# Patient Record
Sex: MALE | Race: WHITE | NOT HISPANIC OR LATINO | Employment: PART TIME | URBAN - METROPOLITAN AREA
[De-identification: names, ages, dates, MRNs, and addresses within clinical notes are randomized per-mention and may not be internally consistent; named-entity substitution may affect disease eponyms.]

---

## 2019-12-04 ENCOUNTER — HOSPITAL ENCOUNTER (INPATIENT)
Facility: HOSPITAL | Age: 44
LOS: 2 days | Discharge: HOME | DRG: 440 | End: 2019-12-06
Attending: EMERGENCY MEDICINE | Admitting: HOSPITALIST
Payer: MEDICAID

## 2019-12-04 DIAGNOSIS — K85.90 ACUTE PANCREATITIS: Primary | ICD-10-CM

## 2019-12-04 DIAGNOSIS — K85.90 ACUTE PANCREATITIS, UNSPECIFIED COMPLICATION STATUS, UNSPECIFIED PANCREATITIS TYPE: ICD-10-CM

## 2019-12-04 DIAGNOSIS — R07.9 CHEST PAIN: ICD-10-CM

## 2019-12-04 DIAGNOSIS — R10.13 EPIGASTRIC PAIN: ICD-10-CM

## 2019-12-04 PROBLEM — I10 ESSENTIAL HYPERTENSION: Status: ACTIVE | Noted: 2019-12-04

## 2019-12-04 PROBLEM — Z72.0 TOBACCO ABUSE: Status: ACTIVE | Noted: 2019-12-04

## 2019-12-04 PROBLEM — Z72.0 TOBACCO ABUSE: Chronic | Status: ACTIVE | Noted: 2019-12-04

## 2019-12-04 PROBLEM — I10 ESSENTIAL HYPERTENSION: Chronic | Status: ACTIVE | Noted: 2019-12-04

## 2019-12-04 LAB
ALBUMIN SERPL-MCNC: 3.8 G/DL (ref 3.3–5.5)
ALBUMIN SERPL-MCNC: 3.8 G/DL (ref 3.3–5.5)
ALLENS TEST: ABNORMAL
ALP SERPL-CCNC: 60 U/L (ref 42–141)
ALP SERPL-CCNC: 64 U/L (ref 42–141)
BILIRUB SERPL-MCNC: 0.5 MG/DL (ref 0.2–1.6)
BILIRUB SERPL-MCNC: 0.6 MG/DL (ref 0.2–1.6)
BILIRUBIN, POC UA: NEGATIVE
BLOOD, POC UA: NEGATIVE
BUN SERPL-MCNC: 17 MG/DL (ref 7–22)
CALCIUM SERPL-MCNC: 9.8 MG/DL (ref 8–10.3)
CHLORIDE SERPL-SCNC: 103 MMOL/L (ref 98–108)
CLARITY, POC UA: CLEAR
COLOR, POC UA: YELLOW
CREAT SERPL-MCNC: 1 MG/DL (ref 0.6–1.2)
GLUCOSE SERPL-MCNC: 101 MG/DL (ref 73–118)
GLUCOSE, POC UA: NEGATIVE
HCO3 UR-SCNC: 26.7 MMOL/L (ref 24–28)
KETONES, POC UA: NEGATIVE
LDH SERPL L TO P-CCNC: 0.94 MMOL/L (ref 0.5–2.2)
LEUKOCYTE EST, POC UA: NEGATIVE
LIPASE SERPL-CCNC: 57 U/L (ref 4–60)
NITRITE, POC UA: NEGATIVE
PCO2 BLDA: 47.4 MMHG (ref 35–45)
PH SMN: 7.36 [PH] (ref 7.35–7.45)
PH UR STRIP: 6 [PH]
PO2 BLDA: 46 MMHG (ref 40–60)
POC ALT (SGPT): 38 U/L (ref 10–47)
POC ALT (SGPT): 43 U/L (ref 10–47)
POC AMYLASE: 48 U/L (ref 14–97)
POC AST (SGOT): 28 U/L (ref 11–38)
POC AST (SGOT): 29 U/L (ref 11–38)
POC BE: 1 MMOL/L
POC CARDIAC TROPONIN I: 0 NG/ML
POC GGT: 53 U/L (ref 5–65)
POC SATURATED O2: 79 % (ref 95–100)
POC TCO2: 28 MMOL/L (ref 18–33)
POC TCO2: 28 MMOL/L (ref 24–29)
POTASSIUM BLD-SCNC: 3.9 MMOL/L (ref 3.6–5.1)
PROTEIN, POC UA: NEGATIVE
PROTEIN, POC: 7 G/DL (ref 6.4–8.1)
PROTEIN, POC: 7.3 G/DL (ref 6.4–8.1)
SAMPLE: ABNORMAL
SAMPLE: NORMAL
SITE: ABNORMAL
SODIUM BLD-SCNC: 144 MMOL/L (ref 128–145)
SPECIFIC GRAVITY, POC UA: 1.01
UROBILINOGEN, POC UA: 0.2 E.U./DL

## 2019-12-04 PROCEDURE — 82803 BLOOD GASES ANY COMBINATION: CPT | Mod: ER

## 2019-12-04 PROCEDURE — 25000003 PHARM REV CODE 250: Performed by: INTERNAL MEDICINE

## 2019-12-04 PROCEDURE — 11000001 HC ACUTE MED/SURG PRIVATE ROOM

## 2019-12-04 PROCEDURE — 86677 HELICOBACTER PYLORI ANTIBODY: CPT

## 2019-12-04 PROCEDURE — 85025 COMPLETE CBC W/AUTO DIFF WBC: CPT | Mod: ER

## 2019-12-04 PROCEDURE — 99284 EMERGENCY DEPT VISIT MOD MDM: CPT | Mod: 25,ER

## 2019-12-04 PROCEDURE — 63600175 PHARM REV CODE 636 W HCPCS: Performed by: INTERNAL MEDICINE

## 2019-12-04 PROCEDURE — 81003 URINALYSIS AUTO W/O SCOPE: CPT | Mod: ER

## 2019-12-04 PROCEDURE — 84484 ASSAY OF TROPONIN QUANT: CPT | Mod: ER

## 2019-12-04 PROCEDURE — 63600175 PHARM REV CODE 636 W HCPCS: Mod: ER | Performed by: EMERGENCY MEDICINE

## 2019-12-04 PROCEDURE — 25000003 PHARM REV CODE 250: Mod: ER | Performed by: EMERGENCY MEDICINE

## 2019-12-04 PROCEDURE — 96361 HYDRATE IV INFUSION ADD-ON: CPT | Mod: ER

## 2019-12-04 PROCEDURE — 82150 ASSAY OF AMYLASE: CPT | Mod: ER

## 2019-12-04 PROCEDURE — 25500020 PHARM REV CODE 255: Mod: ER | Performed by: EMERGENCY MEDICINE

## 2019-12-04 PROCEDURE — 93010 EKG 12-LEAD: ICD-10-PCS | Mod: ,,, | Performed by: INTERNAL MEDICINE

## 2019-12-04 PROCEDURE — 93005 ELECTROCARDIOGRAM TRACING: CPT | Mod: ER

## 2019-12-04 PROCEDURE — 96374 THER/PROPH/DIAG INJ IV PUSH: CPT | Mod: ER

## 2019-12-04 PROCEDURE — 93010 ELECTROCARDIOGRAM REPORT: CPT | Mod: ,,, | Performed by: INTERNAL MEDICINE

## 2019-12-04 PROCEDURE — S0028 INJECTION, FAMOTIDINE, 20 MG: HCPCS | Mod: ER | Performed by: EMERGENCY MEDICINE

## 2019-12-04 PROCEDURE — 83690 ASSAY OF LIPASE: CPT

## 2019-12-04 PROCEDURE — 96375 TX/PRO/DX INJ NEW DRUG ADDON: CPT | Mod: ER

## 2019-12-04 PROCEDURE — 80053 COMPREHEN METABOLIC PANEL: CPT | Mod: ER

## 2019-12-04 RX ORDER — SODIUM CHLORIDE 0.9 % (FLUSH) 0.9 %
10 SYRINGE (ML) INJECTION
Status: CANCELLED | OUTPATIENT
Start: 2019-12-04

## 2019-12-04 RX ORDER — KETOROLAC TROMETHAMINE 30 MG/ML
15 INJECTION, SOLUTION INTRAMUSCULAR; INTRAVENOUS
Status: COMPLETED | OUTPATIENT
Start: 2019-12-04 | End: 2019-12-04

## 2019-12-04 RX ORDER — MORPHINE SULFATE 8 MG/ML
4 INJECTION INTRAMUSCULAR; INTRAVENOUS; SUBCUTANEOUS EVERY 4 HOURS PRN
Status: CANCELLED | OUTPATIENT
Start: 2019-12-04

## 2019-12-04 RX ORDER — GABAPENTIN 300 MG/1
300 CAPSULE ORAL 3 TIMES DAILY
Status: DISCONTINUED | OUTPATIENT
Start: 2019-12-04 | End: 2019-12-06 | Stop reason: HOSPADM

## 2019-12-04 RX ORDER — MORPHINE SULFATE 10 MG/ML
4 INJECTION INTRAMUSCULAR; INTRAVENOUS; SUBCUTANEOUS EVERY 4 HOURS PRN
Status: DISCONTINUED | OUTPATIENT
Start: 2019-12-04 | End: 2019-12-06

## 2019-12-04 RX ORDER — FAMOTIDINE 10 MG/ML
20 INJECTION INTRAVENOUS EVERY 12 HOURS
Status: CANCELLED | OUTPATIENT
Start: 2019-12-04

## 2019-12-04 RX ORDER — ONDANSETRON 2 MG/ML
8 INJECTION INTRAMUSCULAR; INTRAVENOUS EVERY 8 HOURS PRN
Status: DISCONTINUED | OUTPATIENT
Start: 2019-12-04 | End: 2019-12-06 | Stop reason: HOSPADM

## 2019-12-04 RX ORDER — TALC
9 POWDER (GRAM) TOPICAL NIGHTLY PRN
Status: DISCONTINUED | OUTPATIENT
Start: 2019-12-04 | End: 2019-12-06 | Stop reason: HOSPADM

## 2019-12-04 RX ORDER — GABAPENTIN 300 MG/1
300 CAPSULE ORAL 3 TIMES DAILY
COMMUNITY

## 2019-12-04 RX ORDER — AMLODIPINE BESYLATE 10 MG/1
10 TABLET ORAL DAILY
COMMUNITY

## 2019-12-04 RX ORDER — CLONIDINE HYDROCHLORIDE 0.1 MG/1
0.1 TABLET ORAL 3 TIMES DAILY PRN
Status: DISCONTINUED | OUTPATIENT
Start: 2019-12-04 | End: 2019-12-06 | Stop reason: HOSPADM

## 2019-12-04 RX ORDER — AMOXICILLIN 250 MG
1 CAPSULE ORAL 2 TIMES DAILY PRN
Status: DISCONTINUED | OUTPATIENT
Start: 2019-12-04 | End: 2019-12-06 | Stop reason: HOSPADM

## 2019-12-04 RX ORDER — HYDROMORPHONE HYDROCHLORIDE 2 MG/ML
1 INJECTION, SOLUTION INTRAMUSCULAR; INTRAVENOUS; SUBCUTANEOUS EVERY 4 HOURS PRN
Status: CANCELLED | OUTPATIENT
Start: 2019-12-04

## 2019-12-04 RX ORDER — ONDANSETRON 2 MG/ML
8 INJECTION INTRAMUSCULAR; INTRAVENOUS
Status: COMPLETED | OUTPATIENT
Start: 2019-12-04 | End: 2019-12-04

## 2019-12-04 RX ORDER — IBUPROFEN 200 MG
1 TABLET ORAL DAILY
Status: DISCONTINUED | OUTPATIENT
Start: 2019-12-05 | End: 2019-12-06 | Stop reason: HOSPADM

## 2019-12-04 RX ORDER — ACETAMINOPHEN 500 MG
500 TABLET ORAL EVERY 6 HOURS PRN
Status: DISCONTINUED | OUTPATIENT
Start: 2019-12-04 | End: 2019-12-06 | Stop reason: HOSPADM

## 2019-12-04 RX ORDER — SODIUM CHLORIDE, SODIUM LACTATE, POTASSIUM CHLORIDE, CALCIUM CHLORIDE 600; 310; 30; 20 MG/100ML; MG/100ML; MG/100ML; MG/100ML
1000 INJECTION, SOLUTION INTRAVENOUS
Status: COMPLETED | OUTPATIENT
Start: 2019-12-04 | End: 2019-12-04

## 2019-12-04 RX ORDER — OXYCODONE HYDROCHLORIDE 5 MG/1
5 TABLET ORAL EVERY 6 HOURS PRN
Status: DISCONTINUED | OUTPATIENT
Start: 2019-12-04 | End: 2019-12-06 | Stop reason: HOSPADM

## 2019-12-04 RX ORDER — PROCHLORPERAZINE EDISYLATE 5 MG/ML
5 INJECTION INTRAMUSCULAR; INTRAVENOUS EVERY 6 HOURS PRN
Status: DISCONTINUED | OUTPATIENT
Start: 2019-12-04 | End: 2019-12-06 | Stop reason: HOSPADM

## 2019-12-04 RX ORDER — FAMOTIDINE 10 MG/ML
40 INJECTION INTRAVENOUS
Status: COMPLETED | OUTPATIENT
Start: 2019-12-04 | End: 2019-12-04

## 2019-12-04 RX ORDER — AMLODIPINE BESYLATE 5 MG/1
10 TABLET ORAL DAILY
Status: DISCONTINUED | OUTPATIENT
Start: 2019-12-05 | End: 2019-12-06 | Stop reason: HOSPADM

## 2019-12-04 RX ORDER — MORPHINE SULFATE 8 MG/ML
4 INJECTION INTRAMUSCULAR; INTRAVENOUS; SUBCUTANEOUS
Status: COMPLETED | OUTPATIENT
Start: 2019-12-04 | End: 2019-12-04

## 2019-12-04 RX ORDER — SODIUM CHLORIDE, SODIUM LACTATE, POTASSIUM CHLORIDE, CALCIUM CHLORIDE 600; 310; 30; 20 MG/100ML; MG/100ML; MG/100ML; MG/100ML
INJECTION, SOLUTION INTRAVENOUS CONTINUOUS
Status: DISCONTINUED | OUTPATIENT
Start: 2019-12-04 | End: 2019-12-05

## 2019-12-04 RX ADMIN — SODIUM CHLORIDE, SODIUM LACTATE, POTASSIUM CHLORIDE, AND CALCIUM CHLORIDE: .6; .31; .03; .02 INJECTION, SOLUTION INTRAVENOUS at 09:12

## 2019-12-04 RX ADMIN — SODIUM CHLORIDE 1000 ML: 0.9 INJECTION, SOLUTION INTRAVENOUS at 03:12

## 2019-12-04 RX ADMIN — FAMOTIDINE 40 MG: 10 INJECTION INTRAVENOUS at 03:12

## 2019-12-04 RX ADMIN — IOHEXOL 100 ML: 350 INJECTION, SOLUTION INTRAVENOUS at 04:12

## 2019-12-04 RX ADMIN — OXYCODONE HYDROCHLORIDE 5 MG: 5 TABLET ORAL at 09:12

## 2019-12-04 RX ADMIN — SODIUM CHLORIDE, SODIUM LACTATE, POTASSIUM CHLORIDE, AND CALCIUM CHLORIDE 1000 ML: .6; .31; .03; .02 INJECTION, SOLUTION INTRAVENOUS at 05:12

## 2019-12-04 RX ADMIN — MORPHINE SULFATE 4 MG: 8 INJECTION, SOLUTION INTRAMUSCULAR; INTRAVENOUS at 05:12

## 2019-12-04 RX ADMIN — ONDANSETRON 8 MG: 2 INJECTION INTRAMUSCULAR; INTRAVENOUS at 05:12

## 2019-12-04 RX ADMIN — GABAPENTIN 300 MG: 300 CAPSULE ORAL at 09:12

## 2019-12-04 RX ADMIN — MORPHINE SULFATE 4 MG: 10 INJECTION INTRAVENOUS at 11:12

## 2019-12-04 RX ADMIN — KETOROLAC TROMETHAMINE 15 MG: 30 INJECTION, SOLUTION INTRAMUSCULAR at 03:12

## 2019-12-04 NOTE — ED TRIAGE NOTES
"Pt c/o of umbilicus pain that started a couple months ago. Pt states the pain is worse and he "is tired of dealing with this." PT reports "flu like pain in my legs, arms, and chest and really hot for like a few months now."   "

## 2019-12-04 NOTE — ED PROVIDER NOTES
Encounter Date: 12/4/2019    SCRIBE #1 NOTE: I, Jonathan Contreras, am scribing for, and in the presence of,  Dr. Iniguez. I have scribed the following portions of the note - the EKG reading. Other sections scribed: HPI, ROS, PE.       History     Chief Complaint   Patient presents with    Abdominal Pain     pt reports epigastric abdominal pain x 3 months Pt with generalized body x 3 months as well. Denies fever and vomiting. Pt reports intermittent diarrhea      Roverto Delgado is a 44 y.o. male with history of pancreatitis and HTN who presents to the ED complaining of epigastric pain. Patient states it feels like he has pancreatitis again.  Patient reports  myalgias to the upper and lower extremities and 6/10 sharp non-radiating epigastric abdominal pain worse over last few days but on and off for 3 months. Abdominal pain is prominent at 8/10 at night. Patient also reports intermittent diarrhea, nausea, and vomiting. PSHx includes cholecystitis. PCP is Yuliya Rashid. Patient takes gabapentin. Denies smoking or vaping. Patient does chew tobacco. He reports drinks 5-6 beers 3 days ago and categorizes himself as an occasional drinker, not a daily drinker    The history is provided by the patient. No  was used.     Review of patient's allergies indicates:  No Known Allergies  Past Medical History:   Diagnosis Date    Hypertension     Pancreatitis      No past surgical history on file.  No family history on file.  Social History     Tobacco Use    Smoking status: Current Some Day Smoker    Smokeless tobacco: Current User     Types: Snuff   Substance Use Topics    Alcohol use: Not Currently     Comment: occasionally    Drug use: Never     Review of Systems   Constitutional: Negative for fever.   HENT: Negative for rhinorrhea and sore throat.    Respiratory: Negative for shortness of breath.    Cardiovascular: Negative for leg swelling.   Gastrointestinal: Positive for abdominal pain (epigastric),  diarrhea (intermittent), nausea (intermittent) and vomiting (intermittent).   Musculoskeletal: Positive for myalgias (upper and lower extremities).   Skin: Negative for rash.   Neurological: Negative for numbness.   All other systems reviewed and are negative.      Physical Exam     Initial Vitals [12/04/19 1341]   BP Pulse Resp Temp SpO2   (!) 162/102 84 18 98.6 °F (37 °C) 99 %      MAP       --         Patient gave consent to have physical exam performed.    Physical Exam    Nursing note and vitals reviewed.  Constitutional: He appears well-developed and well-nourished.   HENT:   Head: Normocephalic and atraumatic.   Right Ear: External ear normal.   Left Ear: External ear normal.   Nose: Nose normal.   Mouth/Throat: Oropharynx is clear and moist.   Eyes: Conjunctivae and EOM are normal. Pupils are equal, round, and reactive to light.   Neck: Normal range of motion. Neck supple.   Cardiovascular: Normal rate, regular rhythm, normal heart sounds and intact distal pulses. Exam reveals no gallop and no friction rub.    No murmur heard.  Pulmonary/Chest: Breath sounds normal. No stridor. No respiratory distress. He has no wheezes. He has no rhonchi. He has no rales. He exhibits no tenderness.   Abdominal: Soft. Bowel sounds are normal. He exhibits no distension and no mass. There is tenderness in the epigastric area. There is no rigidity, no rebound and no guarding.   Musculoskeletal: Normal range of motion.   Neurological: He is alert and oriented to person, place, and time. No cranial nerve deficit or sensory deficit. GCS score is 15. GCS eye subscore is 4. GCS verbal subscore is 5. GCS motor subscore is 6.   Skin: Skin is warm and dry. Capillary refill takes less than 2 seconds. No rash noted.   Psychiatric: He has a normal mood and affect. His behavior is normal.         ED Course   Critical Care  Date/Time: 12/4/2019 6:19 PM  Performed by: Sejal Iniguez DO  Authorized by: Sejal Iniguez DO   Direct patient critical  care time: 8 minutes  Additional history critical care time: 8 minutes  Ordering / reviewing critical care time: 8 minutes  Documentation critical care time: 8 minutes  Consulting other physicians critical care time: 8 minutes  Total critical care time (exclusive of procedural time) : 40 minutes  Critical care was necessary to treat or prevent imminent or life-threatening deterioration of the following conditions: sepsis, circulatory failure, renal failure and dehydration.  Critical care was time spent personally by me on the following activities: evaluation of patient's response to treatment, examination of patient, obtaining history from patient or surrogate, ordering and performing treatments and interventions, ordering and review of laboratory studies, ordering and review of radiographic studies, pulse oximetry, re-evaluation of patient's condition and review of old charts.        Labs Reviewed   H. PYLORI ANTIBODY, IGG   LIPASE   POCT CBC   POCT URINALYSIS W/O SCOPE   POCT URINALYSIS W/O SCOPE   POCT CMP   POCT AMYLASE   POCT LIVER PANEL   POCT CMP   POCT TROPONIN             EKG Readings: (Independently Interpreted)   No STEMI. Rate of 85. Normal Sinus Rhythm. Normal Axis. Normal EKG. QTc normal at 437. No prior EKG for comparison.       Imaging Results           CT Abdomen Pelvis With Contrast (Final result)  Result time 12/04/19 16:25:43    Final result by Amina Finnegan MD (12/04/19 16:25:43)                 Impression:      Edematous pancreatic head.  Of adjacent peripancreatic infiltration.  Findings are concerning for acute pancreatitis.  Recommend correlation with amylase and lipase.    Mildly lobular margins of the liver.  Enlargement of the spleen.  Findings may suggest the stigmata of cirrhosis.  Consider correlation with liver function tests.    Subcentimeter right hepatic cysts.    This report was flagged in Epic as abnormal.      Electronically signed by: Amina  Kain  Date:    12/04/2019  Time:    16:25             Narrative:    EXAMINATION:  CT OF ABDOMEN PELVIS WITH    CLINICAL HISTORY:  Abd pain, fever, abscess suspected;Nausea, vomiting, diarrhea;Epigastric pain with history of pancreatitis;    TECHNIQUE:  5 mm enhanced axial images were obtained from the lung bases through the greater trochanters.  One hundred mL of Omnipaque 350 was injected.    COMPARISON:  None.    FINDINGS:  The contour margins of the liver are slightly lobular.    The pancreatic head is edematous.  There's extensive arm peripancreatic infiltration about the pancreatic head.    The spleen is mildly enlarged measuring approximately 12.3 x 5.4 cm.    There are subcentimeter right hepatic cysts.    The left kidney and adrenal glands are unremarkable. The gallbladder is surgically absent..    There is no definite evidence for abdominal adenopathy or ascites.  There is a tiny fat containing umbilical hernia.    There are no pelvic masses or adenopathy.  The appendix is normal.    There is no free fluid in the pelvis.    There is minimal bibasilar atelectasis.    There's straightening of the normal lumbar lordosis, which may indicate muscular spasm..  There are marginal osteophytes.                                 Medical Decision Making:   History:   Old Medical Records: I decided to obtain old medical records.  Independently Interpreted Test(s):   I have ordered and independently interpreted EKG Reading(s) - see prior notes  Clinical Tests:   Lab Tests: Ordered and Reviewed  Radiological Study: Reviewed and Ordered  Medical Tests: Ordered and Reviewed    Medical decision making   Chief complaint: epigastric pain, myalgias, intermittent nausea, vomiting, and diarrhea.  Differential diagnosis: Gastritis, gastroenteritis, pancreatitis, small bowel obstruction, urinary tract infection, and electrolyte imbalance.    Patient is agreeable to transfer & admission at [facility].    Requesting  consultation with hospitalist for services not available at this facility.   Discussed patient's presentation, past medical history, physical exam, labs, radiology results, vital signs, and ED course.  Consultation with DR DONAHUE. for transfer and admission at 6:20.  At this time patient will be transferred & admitted.  Admit orders completed as requested.  Patient will be transferred via EMS to accepting facility.      At time of transfer patient is awake alert oriented x4 speaking clearly in full sentences and moving all 4 extremities.            Scribe Attestation:   Scribe #1: I performed the above scribed service and the documentation accurately describes the services I performed. I attest to the accuracy of the note.     I, Dr. Sejal Iniguez, personally performed the services described in this documentation. This document was produced by a scribe under my direction and in my presence. All medical record entries made by the scribe were at my direction and in my presence.  I have reviewed the chart and agree that the record reflects my personal performance and is accurate and complete. Sejal Iniguez DO.     12/04/2019 5:33 PM                        Clinical Impression:     1. Acute pancreatitis    2. Chest pain    3. Epigastric pain    4. Acute pancreatitis, unspecified complication status, unspecified pancreatitis type                                Sejal Iniguez DO  12/04/19 4092

## 2019-12-05 LAB
ALBUMIN SERPL BCP-MCNC: 4.1 G/DL (ref 3.5–5.2)
ALP SERPL-CCNC: 69 U/L (ref 55–135)
ALT SERPL W/O P-5'-P-CCNC: 35 U/L (ref 10–44)
AMYLASE SERPL-CCNC: 49 U/L (ref 20–110)
ANION GAP SERPL CALC-SCNC: 10 MMOL/L (ref 8–16)
AST SERPL-CCNC: 19 U/L (ref 10–40)
BASOPHILS # BLD AUTO: 0.08 K/UL (ref 0–0.2)
BASOPHILS NFR BLD: 0.8 % (ref 0–1.9)
BILIRUB SERPL-MCNC: 0.6 MG/DL (ref 0.1–1)
BUN SERPL-MCNC: 10 MG/DL (ref 6–20)
CALCIUM SERPL-MCNC: 9.6 MG/DL (ref 8.7–10.5)
CHLORIDE SERPL-SCNC: 108 MMOL/L (ref 95–110)
CHOLEST SERPL-MCNC: 222 MG/DL (ref 120–199)
CHOLEST/HDLC SERPL: 4.8 {RATIO} (ref 2–5)
CO2 SERPL-SCNC: 31 MMOL/L (ref 23–29)
CREAT SERPL-MCNC: 0.9 MG/DL (ref 0.5–1.4)
DIFFERENTIAL METHOD: ABNORMAL
EOSINOPHIL # BLD AUTO: 0.9 K/UL (ref 0–0.5)
EOSINOPHIL NFR BLD: 9.3 % (ref 0–8)
ERYTHROCYTE [DISTWIDTH] IN BLOOD BY AUTOMATED COUNT: 12.4 % (ref 11.5–14.5)
EST. GFR  (AFRICAN AMERICAN): >60 ML/MIN/1.73 M^2
EST. GFR  (NON AFRICAN AMERICAN): >60 ML/MIN/1.73 M^2
GLUCOSE SERPL-MCNC: 85 MG/DL (ref 70–110)
H PYLORI IGG SERPL QL IA: POSITIVE
HCT VFR BLD AUTO: 41.5 % (ref 40–54)
HDLC SERPL-MCNC: 46 MG/DL (ref 40–75)
HDLC SERPL: 20.7 % (ref 20–50)
HGB BLD-MCNC: 13.9 G/DL (ref 14–18)
IMM GRANULOCYTES # BLD AUTO: 0.02 K/UL (ref 0–0.04)
IMM GRANULOCYTES NFR BLD AUTO: 0.2 % (ref 0–0.5)
LDH SERPL L TO P-CCNC: 174 U/L (ref 110–260)
LDLC SERPL CALC-MCNC: 141 MG/DL (ref 63–159)
LIPASE SERPL-CCNC: 27 U/L (ref 4–60)
LYMPHOCYTES # BLD AUTO: 3.4 K/UL (ref 1–4.8)
LYMPHOCYTES NFR BLD: 35.3 % (ref 18–48)
MAGNESIUM SERPL-MCNC: 2.2 MG/DL (ref 1.6–2.6)
MCH RBC QN AUTO: 32.3 PG (ref 27–31)
MCHC RBC AUTO-ENTMCNC: 33.5 G/DL (ref 32–36)
MCV RBC AUTO: 96 FL (ref 82–98)
MONOCYTES # BLD AUTO: 0.7 K/UL (ref 0.3–1)
MONOCYTES NFR BLD: 7.3 % (ref 4–15)
NEUTROPHILS # BLD AUTO: 4.5 K/UL (ref 1.8–7.7)
NEUTROPHILS NFR BLD: 47.1 % (ref 38–73)
NONHDLC SERPL-MCNC: 176 MG/DL
NRBC BLD-RTO: 0 /100 WBC
PHOSPHATE SERPL-MCNC: 3.3 MG/DL (ref 2.7–4.5)
PLATELET # BLD AUTO: 233 K/UL (ref 150–350)
PMV BLD AUTO: 9.9 FL (ref 9.2–12.9)
POTASSIUM SERPL-SCNC: 4.3 MMOL/L (ref 3.5–5.1)
PROT SERPL-MCNC: 6.8 G/DL (ref 6–8.4)
RBC # BLD AUTO: 4.31 M/UL (ref 4.6–6.2)
SODIUM SERPL-SCNC: 149 MMOL/L (ref 136–145)
TRIGL SERPL-MCNC: 175 MG/DL (ref 30–150)
WBC # BLD AUTO: 9.58 K/UL (ref 3.9–12.7)

## 2019-12-05 PROCEDURE — 63600175 PHARM REV CODE 636 W HCPCS: Performed by: HOSPITALIST

## 2019-12-05 PROCEDURE — 85025 COMPLETE CBC W/AUTO DIFF WBC: CPT

## 2019-12-05 PROCEDURE — 83735 ASSAY OF MAGNESIUM: CPT

## 2019-12-05 PROCEDURE — 84100 ASSAY OF PHOSPHORUS: CPT

## 2019-12-05 PROCEDURE — 11000001 HC ACUTE MED/SURG PRIVATE ROOM

## 2019-12-05 PROCEDURE — 80053 COMPREHEN METABOLIC PANEL: CPT

## 2019-12-05 PROCEDURE — 36415 COLL VENOUS BLD VENIPUNCTURE: CPT

## 2019-12-05 PROCEDURE — 80061 LIPID PANEL: CPT

## 2019-12-05 PROCEDURE — 82150 ASSAY OF AMYLASE: CPT

## 2019-12-05 PROCEDURE — 83690 ASSAY OF LIPASE: CPT

## 2019-12-05 PROCEDURE — 25000003 PHARM REV CODE 250: Performed by: INTERNAL MEDICINE

## 2019-12-05 PROCEDURE — 83615 LACTATE (LD) (LDH) ENZYME: CPT

## 2019-12-05 PROCEDURE — S4991 NICOTINE PATCH NONLEGEND: HCPCS | Performed by: INTERNAL MEDICINE

## 2019-12-05 PROCEDURE — 63600175 PHARM REV CODE 636 W HCPCS: Performed by: INTERNAL MEDICINE

## 2019-12-05 RX ORDER — SODIUM CHLORIDE, SODIUM LACTATE, POTASSIUM CHLORIDE, CALCIUM CHLORIDE 600; 310; 30; 20 MG/100ML; MG/100ML; MG/100ML; MG/100ML
INJECTION, SOLUTION INTRAVENOUS CONTINUOUS
Status: DISCONTINUED | OUTPATIENT
Start: 2019-12-05 | End: 2019-12-06 | Stop reason: HOSPADM

## 2019-12-05 RX ADMIN — GABAPENTIN 300 MG: 300 CAPSULE ORAL at 02:12

## 2019-12-05 RX ADMIN — SODIUM CHLORIDE, SODIUM LACTATE, POTASSIUM CHLORIDE, AND CALCIUM CHLORIDE: .6; .31; .03; .02 INJECTION, SOLUTION INTRAVENOUS at 02:12

## 2019-12-05 RX ADMIN — MORPHINE SULFATE 4 MG: 10 INJECTION INTRAVENOUS at 02:12

## 2019-12-05 RX ADMIN — SODIUM CHLORIDE, SODIUM LACTATE, POTASSIUM CHLORIDE, AND CALCIUM CHLORIDE: .6; .31; .03; .02 INJECTION, SOLUTION INTRAVENOUS at 07:12

## 2019-12-05 RX ADMIN — GABAPENTIN 300 MG: 300 CAPSULE ORAL at 08:12

## 2019-12-05 RX ADMIN — Medication 9 MG: at 08:12

## 2019-12-05 RX ADMIN — AMLODIPINE BESYLATE 10 MG: 5 TABLET ORAL at 07:12

## 2019-12-05 RX ADMIN — GABAPENTIN 300 MG: 300 CAPSULE ORAL at 07:12

## 2019-12-05 RX ADMIN — MORPHINE SULFATE 4 MG: 10 INJECTION INTRAVENOUS at 08:12

## 2019-12-05 RX ADMIN — MORPHINE SULFATE 4 MG: 10 INJECTION INTRAVENOUS at 07:12

## 2019-12-05 RX ADMIN — MORPHINE SULFATE 4 MG: 10 INJECTION INTRAVENOUS at 03:12

## 2019-12-05 RX ADMIN — SODIUM CHLORIDE, SODIUM LACTATE, POTASSIUM CHLORIDE, AND CALCIUM CHLORIDE: .6; .31; .03; .02 INJECTION, SOLUTION INTRAVENOUS at 08:12

## 2019-12-05 RX ADMIN — NICOTINE 1 PATCH: 21 PATCH, EXTENDED RELEASE TRANSDERMAL at 07:12

## 2019-12-05 NOTE — CONSULTS
.[facility]  Gastroenterology  Consult Note    Patient Name: Roverto Delgado  MRN: [id number]  Admission Date: 12/4/2019  Hospital Length of Stay: 1 days  Code Status: Full Code   Primary Care Physician: Primary Doctor No  Principal Problem:Acute pancreatitis    Consults  Subjective:     Chief complaint: Abdominal pain.    HPI: The patient is a 44 year old male with a history of HTN and biliary pancreatitis s/p lap luis presenting with acute pancreatitis.  He reports chronic, intermittent epigastric pain that has been occurring over the last two months.  The pain is moderate to severe, non-radiating, described as sharp/stabbing in nature.  It is typically aggravated by meals and alcohol.  There are no obvious palliative factors.  It occurs almost daily and can last several hours at a time.  He notes occasional nausea and vomiting associated with the pain.  He will occasionally have nonbloody diarrhea.  He admits to social use of alcohol (10-12 beers on the weekends) but denies every day use.  He denies a history of jaundice.  No fever or chills.  He reports an episode of pancreatitis that occurred three years ago.  This was felt to be biliary, and he had his gallbladder removed.    Past medical history:  Hypertension.  Biliary pancreatitis.    Past surgical history:  Laparoscopic cholecystectomy.  Surgery on heels.    Social history:  Tobacco use: chewing tobacco.  Alcohol use: 10-12 beers weekly.  Illicit drug use: denies.    Family history:  No family history of liver or colon cancer.    Medications:  Medications Prior to Admission   Medication Sig Dispense Refill Last Dose    amLODIPine (NORVASC) 10 MG tablet Take 10 mg by mouth once daily.   12/3/2019 at Unknown time    gabapentin (NEURONTIN) 300 MG capsule Take 300 mg by mouth 3 (three) times daily.   12/4/2019 at Unknown time       Allergies:  No known drug allergies.    Review of systems:  CONSTITUTIONAL: Negative for fever, chills,  weakness, weight loss, weight gain.  HEENT: Negative for blurred vision, hearing loss, nasal congestion, dry mouth, sore throat.  CARDIOVASCULAR: Negative for chest pain or palpitations.  RESPIRATORY: Negative for SOB or cough.  GASTROINTESTINAL: See HPI  GENITOURINARY: Negative for dysuria or hematuria.  MUSCULOSKELETAL: Negative for osteoarthritis or muscle pain.  SKIN: Negative for rashes/lesions.  NEUROLOGIC: Negative for headaches, numbness/tingling.  ENDOCRINE: Negative for diabetes or thyroid abnormalities.  HEMATOLOGIC: Negative for anemia or blood dyscrasias.    Objective:     Vital Signs (Most Recent):  Temp: 98 °F (36.7 °C) (12/05/19 1105)  Pulse: 77 (12/05/19 1105)  Resp: 18 (12/05/19 1105)  BP: 133/82 (12/05/19 1105)  SpO2: 96 % (12/05/19 1105) Vital Signs (24h Range):  Temp:  [97.5 °F (36.4 °C)-98.1 °F (36.7 °C)] 98 °F (36.7 °C)  Pulse:  [57-77] 77  Resp:  [18-19] 18  SpO2:  [95 %-100 %] 96 %  BP: (125-155)/() 133/82     Physical examination:  General: Well developed white male in no apparent distress.  HENT: NCAT, atraumatic, hearing grossly intact, no visible or palpable thyroid mass  Eyes: PERRL, EOMI, anicteric sclera  Cardiovascular: Regular rate and rhythm. No peripheral edema.   Lungs: Non-labored respirations. Breath sounds equal.   Abdomen: + BS. Minimal epigastric TTP. Nondistended.  Extremities: No C/C, 2+ dorsalis pedis pulses bilaterally  Neuro: AA&O x 3, no asterixes or tremors  Psych: Appropriate mood and affect. No SI.  Skin: No jaundice, rashes or lesions  Musculoskeletal: 5/5 strength bilaterally    CBC:   Recent Labs   Lab 12/05/19  0445   WBC 9.58   HGB 13.9*   HCT 41.5        CMP:   Recent Labs   Lab 12/05/19  0445   GLU 85   CALCIUM 9.6   ALBUMIN 4.1   PROT 6.8   *   K 4.3   CO2 31*      BUN 10   CREATININE 0.9   ALKPHOS 69   ALT 35   AST 19   BILITOT 0.6     Lipase:   Recent Labs   Lab 12/04/19  1714   LIPASE 57       Imaging:  CT abd/pelvis  (12/4/19):  Impression:  Edematous pancreatic head.  Of adjacent peripancreatic infiltration.  Findings are concerning for acute pancreatitis.  Recommend correlation with amylase and lipase.  Mildly lobular margins of the liver.  Enlargement of the spleen.  Findings may suggest the stigmata of cirrhosis.  Consider correlation with liver function tests  Subcentimeter right hepatic cysts.    Assessment:   44 year old male with a history of HTN and biliary pancreatitis s/p lap luis presenting with acute on chronic epigastric pain, found to have evidence of acute pancreatitis on CT despite normal lipase.  Suspect 2/2 alcohol use.  Triglycerides are normal, and he is s/p cholecystectomy with normal LFTs.  No leukocytosis.  BUN / Cr look good.    Plan:   1.  Continue IV fluids (LR), analgesics / antiemetics prn.  2.  Advance to low fat diet and see if he tolerates.  3.  Repeat BMP / amylase / lipase in the morning.  4.  Discussed importance of alcohol cessation with patient in detail.  5.  Hopefully home in the next 24-48 hours if continues to improve.    Thank you for your consult.     Radha Kelly PA-C  Gastroenterology  [facility]

## 2019-12-05 NOTE — SUBJECTIVE & OBJECTIVE
Interval History: no new complaints     Review of Systems   Constitutional: Positive for appetite change. Negative for activity change, chills, diaphoresis, fatigue, fever and unexpected weight change.   HENT: Negative.  Negative for congestion.    Eyes: Negative.    Respiratory: Negative for cough, chest tightness, shortness of breath and wheezing.    Cardiovascular: Negative for chest pain, palpitations and leg swelling.   Gastrointestinal: Positive for abdominal pain, nausea and vomiting. Negative for abdominal distention, blood in stool, constipation and diarrhea.   Genitourinary: Negative for dysuria and hematuria.   Neurological: Negative for dizziness, seizures, syncope, weakness and light-headedness.   Psychiatric/Behavioral: Negative.      Objective:     Vital Signs (Most Recent):  Temp: 98 °F (36.7 °C) (12/05/19 1105)  Pulse: 77 (12/05/19 1105)  Resp: 18 (12/05/19 1105)  BP: 133/82 (12/05/19 1105)  SpO2: 96 % (12/05/19 1105) Vital Signs (24h Range):  Temp:  [97.5 °F (36.4 °C)-98.6 °F (37 °C)] 98 °F (36.7 °C)  Pulse:  [57-84] 77  Resp:  [18-19] 18  SpO2:  [95 %-100 %] 96 %  BP: (125-162)/() 133/82     Weight: 86.7 kg (191 lb 2.2 oz)  Body mass index is 29.06 kg/m².    Intake/Output Summary (Last 24 hours) at 12/5/2019 1126  Last data filed at 12/5/2019 0643  Gross per 24 hour   Intake 4970 ml   Output 3075 ml   Net 1895 ml      Physical Exam   Constitutional: He is oriented to person, place, and time. He appears well-developed and well-nourished. No distress.   HENT:   Head: Normocephalic and atraumatic.   Right Ear: External ear normal.   Left Ear: External ear normal.   Nose: Nose normal.   Eyes: Right eye exhibits no discharge. Left eye exhibits no discharge.   Neck: Normal range of motion.   Cardiovascular: Normal rate, regular rhythm, normal heart sounds and intact distal pulses. Exam reveals no gallop and no friction rub.   No murmur heard.  Pulmonary/Chest: Effort normal and breath sounds  normal. No stridor. No respiratory distress. He has no wheezes. He has no rales. He exhibits no tenderness.   Abdominal:   Soft, nondistended, nontender to palpation, bowel sounds normal, no rebound or guarding   Musculoskeletal: Normal range of motion. He exhibits no edema.   Neurological: He is alert and oriented to person, place, and time.   Skin: Skin is warm and dry. He is not diaphoretic. No erythema.   Psychiatric: He has a normal mood and affect. His behavior is normal. Judgment and thought content normal.   Nursing note and vitals reviewed.      Significant Labs:   CMP:   Recent Labs   Lab 12/05/19  0445   *   K 4.3      CO2 31*   GLU 85   BUN 10   CREATININE 0.9   CALCIUM 9.6   PROT 6.8   ALBUMIN 4.1   BILITOT 0.6   ALKPHOS 69   AST 19   ALT 35   ANIONGAP 10   EGFRNONAA >60     Lipase:   Recent Labs   Lab 12/04/19  1714   LIPASE 57       Significant Imaging: I have reviewed and interpreted all pertinent imaging results/findings within the past 24 hours.

## 2019-12-05 NOTE — H&P
[facility] Medicine  History & Physical    Patient Name: Roverto Delgado  MRN: [id number]  Admission Date: 12/4/2019  Attending Physician: Ayse Mart MD   Primary Care Provider: Primary Doctor No         Patient information was obtained from patient.     Subjective:     Principal Problem:Acute pancreatitis    Chief Complaint:  Abdominal pain for two months.    HPI: Mr. Roverto Delgado is a 44 y.o. male with essential hypertension and tobacco abuse who presented initially to [facility] ED with complaints of abdominal pain for the past two months.  The pain is localized to the mid epigastrium without radiation and is sharp in quality and 10 out 10 in severity at its worst.  The pain has been intermittent and appears to be worsened on drinking alcohol and on eating spicy or fatty foods.  He had some nausea with nonbilious, nonbloody vomiting which prompted his presentation to the ED today.  The pain initially would last about 4 hour before resolving without intervention but in last few days it has been more constant.  He reports only drinking on Fridays after his work week and does not drink daily.  He denies any change in the color of his stools are his urine reports a previous episode three years ago.  He also denies any diarrhea, constipation, dysuria, hematuria, melena, hematochezia, nor any increased urinary frequency or urgency.  He does report some night sweats but denies any unintentional weight loss.    He went to his PCP at [facility] one week ago and underwent a right upper abdominal quadrant ultrasound--he is still waiting on the results.    Chart Review:  Patient has not had any recent hospitalizations or outpatient clinic visits within the system.    Past Medical History:   Diagnosis Date    Hypertension     Pancreatitis        Past Surgical History:   Procedure Laterality Date    CHOLECYSTECTOMY         Review of patient's allergies  indicates:  No Known Allergies    No current facility-administered medications on file prior to encounter.      Current Outpatient Medications on File Prior to Encounter   Medication Sig    amLODIPine (NORVASC) 10 MG tablet Take 10 mg by mouth once daily.    gabapentin (NEURONTIN) 300 MG capsule Take 300 mg by mouth 3 (three) times daily.     Family History     Reviewed and noncontributory        Tobacco Use    Smoking status: Current Some Day Smoker    Smokeless tobacco: Current User     Types: Snuff   Substance and Sexual Activity    Alcohol use: Not Currently     Comment: occasionally    Drug use: Never    Sexual activity: Not on file     Review of Systems   Constitutional: Positive for appetite change. Negative for activity change, chills, diaphoresis, fatigue, fever and unexpected weight change.   HENT: Negative.  Negative for congestion.    Eyes: Negative.    Respiratory: Negative for cough, chest tightness, shortness of breath and wheezing.    Cardiovascular: Negative for chest pain, palpitations and leg swelling.   Gastrointestinal: Positive for abdominal pain, nausea and vomiting. Negative for abdominal distention, blood in stool, constipation and diarrhea.   Genitourinary: Negative for dysuria and hematuria.   Neurological: Negative for dizziness, seizures, syncope, weakness and light-headedness.   Psychiatric/Behavioral: Negative.      Objective:     Vital Signs (Most Recent):  Temp: 97.5 °F (36.4 °C) (12/05/19 0010)  Pulse: (!) 59 (12/05/19 0010)  Resp: 19 (12/05/19 0010)  BP: (!) 136/92 (12/05/19 0010)  SpO2: 97 % (12/05/19 0010) Vital Signs (24h Range):  Temp:  [97.5 °F (36.4 °C)-98.6 °F (37 °C)] 97.5 °F (36.4 °C)  Pulse:  [57-84] 59  Resp:  [18-19] 19  SpO2:  [95 %-99 %] 97 %  BP: (125-162)/() 136/92     Weight: 88.2 kg (194 lb 7.1 oz)  Body mass index is 29.57 kg/m².    Physical Exam   Constitutional: He is oriented to person, place, and time. He appears well-developed and  well-nourished. No distress.   HENT:   Head: Normocephalic and atraumatic.   Right Ear: External ear normal.   Left Ear: External ear normal.   Nose: Nose normal.   Eyes: Right eye exhibits no discharge. Left eye exhibits no discharge.   Neck: Normal range of motion.   Cardiovascular: Normal rate, regular rhythm, normal heart sounds and intact distal pulses. Exam reveals no gallop and no friction rub.   No murmur heard.  Pulmonary/Chest: Effort normal and breath sounds normal. No stridor. No respiratory distress. He has no wheezes. He has no rales. He exhibits no tenderness.   Abdominal:   Soft, nondistended, nontender to palpation, bowel sounds normal, no rebound or guarding   Musculoskeletal: Normal range of motion. He exhibits no edema.   Neurological: He is alert and oriented to person, place, and time.   Skin: Skin is warm and dry. He is not diaphoretic. No erythema.   Psychiatric: He has a normal mood and affect. His behavior is normal. Judgment and thought content normal.   Nursing note and vitals reviewed.          Significant Labs: All pertinent labs within the past 24 hours have been reviewed.    Significant Imaging: I have reviewed and interpreted all pertinent imaging results/findings within the past 24 hours.    Assessment/Plan:     * Acute pancreatitis  Patient has reported history of pancreatitis and felt that today was a repeat episode.  His lipase, however, was only 57.  Interestingly, his CT-abd/pelvis was significant for [e]dematous pancreatic head.  Of adjacent peripancreatic infiltration.  Findings are concerning for acute pancreatitis.  His LFTs are stable; lipid panel is pending.  Not sure how to explain the discordance between his lab findings and his imaging but he will be started on aggressive IV fluid hydration along with analgesia support.  Will consult Gastroenterology for further recommendations.    Essential hypertension  Patient's blood pressure is poorly-controlled; will continue  home regimen of amlodipine and provide as-needed clonidine.    Tobacco abuse  Patient was counseled on smoking cessation and he will be provided a nicotine transdermal patch applied while inpatient.  Will provide additional smoking cessation counseling prior to discharge.    VTE Risk Mitigation (From admission, onward)         Ordered     Place VINAYAK hose  Until discontinued      12/04/19 2106     IP VTE LOW RISK PATIENT  Once      12/04/19 2106     Place sequential compression device  Until discontinued      12/04/19 2106                   The patient will be placed in inpatient status.          Mihir Daniel M.D.  Staff Nocturnist  Department of Hospital Medicine  [facility]  Pager: [phone]          N.B.: Portions of this note was dictated using M*Modal Fluency Direct--there may be voice recognition errors occasionally missed on review.

## 2019-12-05 NOTE — SUBJECTIVE & OBJECTIVE
Past Medical History:   Diagnosis Date    Hypertension     Pancreatitis        Past Surgical History:   Procedure Laterality Date    CHOLECYSTECTOMY         Review of patient's allergies indicates:  No Known Allergies    No current facility-administered medications on file prior to encounter.      Current Outpatient Medications on File Prior to Encounter   Medication Sig    amLODIPine (NORVASC) 10 MG tablet Take 10 mg by mouth once daily.    gabapentin (NEURONTIN) 300 MG capsule Take 300 mg by mouth 3 (three) times daily.     Family History     Reviewed and noncontributory        Tobacco Use    Smoking status: Current Some Day Smoker    Smokeless tobacco: Current User     Types: Snuff   Substance and Sexual Activity    Alcohol use: Not Currently     Comment: occasionally    Drug use: Never    Sexual activity: Not on file     Review of Systems   Constitutional: Positive for appetite change. Negative for activity change, chills, diaphoresis, fatigue, fever and unexpected weight change.   HENT: Negative.  Negative for congestion.    Eyes: Negative.    Respiratory: Negative for cough, chest tightness, shortness of breath and wheezing.    Cardiovascular: Negative for chest pain, palpitations and leg swelling.   Gastrointestinal: Positive for abdominal pain, nausea and vomiting. Negative for abdominal distention, blood in stool, constipation and diarrhea.   Genitourinary: Negative for dysuria and hematuria.   Neurological: Negative for dizziness, seizures, syncope, weakness and light-headedness.   Psychiatric/Behavioral: Negative.      Objective:     Vital Signs (Most Recent):  Temp: 97.5 °F (36.4 °C) (12/05/19 0010)  Pulse: (!) 59 (12/05/19 0010)  Resp: 19 (12/05/19 0010)  BP: (!) 136/92 (12/05/19 0010)  SpO2: 97 % (12/05/19 0010) Vital Signs (24h Range):  Temp:  [97.5 °F (36.4 °C)-98.6 °F (37 °C)] 97.5 °F (36.4 °C)  Pulse:  [57-84] 59  Resp:  [18-19] 19  SpO2:  [95 %-99 %] 97 %  BP: (125-162)/() 136/92      Weight: 88.2 kg (194 lb 7.1 oz)  Body mass index is 29.57 kg/m².    Physical Exam   Constitutional: He is oriented to person, place, and time. He appears well-developed and well-nourished. No distress.   HENT:   Head: Normocephalic and atraumatic.   Right Ear: External ear normal.   Left Ear: External ear normal.   Nose: Nose normal.   Eyes: Right eye exhibits no discharge. Left eye exhibits no discharge.   Neck: Normal range of motion.   Cardiovascular: Normal rate, regular rhythm, normal heart sounds and intact distal pulses. Exam reveals no gallop and no friction rub.   No murmur heard.  Pulmonary/Chest: Effort normal and breath sounds normal. No stridor. No respiratory distress. He has no wheezes. He has no rales. He exhibits no tenderness.   Abdominal:   Soft, nondistended, nontender to palpation, bowel sounds normal, no rebound or guarding   Musculoskeletal: Normal range of motion. He exhibits no edema.   Neurological: He is alert and oriented to person, place, and time.   Skin: Skin is warm and dry. He is not diaphoretic. No erythema.   Psychiatric: He has a normal mood and affect. His behavior is normal. Judgment and thought content normal.   Nursing note and vitals reviewed.          Significant Labs: All pertinent labs within the past 24 hours have been reviewed.    Significant Imaging: I have reviewed and interpreted all pertinent imaging results/findings within the past 24 hours.

## 2019-12-05 NOTE — ASSESSMENT & PLAN NOTE
Patient has reported history of pancreatitis and felt that today was a repeat episode.  His lipase, however, was only 57.  Interestingly, his CT-abd/pelvis was significant for [e]dematous pancreatic head.  Of adjacent peripancreatic infiltration.  Findings are concerning for acute pancreatitis.  His LFTs are stable; lipid panel is pending.  Not sure how to explain the discordance between his lab findings and his imaging but he will be started on aggressive IV fluid hydration along with analgesia support.  Will consult Gastroenterology for further recommendations.

## 2019-12-05 NOTE — NURSING
Report rec'd from CHELY Bates at [facility]. Patient dx: acute pancreatitis. Pain controlled with morphine IV. IV fluids. AAOx4. Inpatient. Await arrival to floor.

## 2019-12-05 NOTE — PROGRESS NOTES
[facility] Medicine  Progress Note    Patient Name: Roverto Delgado  MRN: [id number]  Patient Class: IP- Inpatient   Admission Date: 12/4/2019  Length of Stay: 1 days  Attending Physician: Ayse Mart MD  Primary Care Provider: Primary Doctor No        Subjective:     Principal Problem:Acute pancreatitis        HPI:  Mr. Roverto Delgado is a 44 y.o. male with essential hypertension and tobacco abuse who presented initially to [facility] ED with complaints of abdominal pain for the past two months.  The pain is localized to the mid epigastrium without radiation and is sharp in quality and 10 out 10 in severity at its worst.  The pain has been intermittent and appears to be worsened on drinking alcohol and on eating spicy or fatty foods.  He had some nausea with nonbilious, nonbloody vomiting which prompted his presentation to the ED today.  The pain initially would last about 4 hour before resolving without intervention but in last few days it has been more constant.  He reports only drinking on Fridays after his work week and does not drink daily.  He denies any change in the color of his stools are his urine reports a previous episode three years ago.  He also denies any diarrhea, constipation, dysuria, hematuria, melena, hematochezia, nor any increased urinary frequency or urgency.  He does report some night sweats but denies any unintentional weight loss.    He went to his PCP at [facility] one week ago and underwent a right upper abdominal quadrant ultrasound--he is still waiting on the results.    Overview/Hospital Course:  Pt admitted with acute pancreatitis from [place] ED. On LR IVF and clear liquid diet.  He denies daily ETOH use. Pain controlled.     Interval History: no new complaints     Review of Systems   Constitutional: Positive for appetite change. Negative for activity change, chills, diaphoresis, fatigue, fever and unexpected weight change.   HENT:  Negative.  Negative for congestion.    Eyes: Negative.    Respiratory: Negative for cough, chest tightness, shortness of breath and wheezing.    Cardiovascular: Negative for chest pain, palpitations and leg swelling.   Gastrointestinal: Positive for abdominal pain, nausea and vomiting. Negative for abdominal distention, blood in stool, constipation and diarrhea.   Genitourinary: Negative for dysuria and hematuria.   Neurological: Negative for dizziness, seizures, syncope, weakness and light-headedness.   Psychiatric/Behavioral: Negative.      Objective:     Vital Signs (Most Recent):  Temp: 98 °F (36.7 °C) (12/05/19 1105)  Pulse: 77 (12/05/19 1105)  Resp: 18 (12/05/19 1105)  BP: 133/82 (12/05/19 1105)  SpO2: 96 % (12/05/19 1105) Vital Signs (24h Range):  Temp:  [97.5 °F (36.4 °C)-98.6 °F (37 °C)] 98 °F (36.7 °C)  Pulse:  [57-84] 77  Resp:  [18-19] 18  SpO2:  [95 %-100 %] 96 %  BP: (125-162)/() 133/82     Weight: 86.7 kg (191 lb 2.2 oz)  Body mass index is 29.06 kg/m².    Intake/Output Summary (Last 24 hours) at 12/5/2019 1126  Last data filed at 12/5/2019 0643  Gross per 24 hour   Intake 4970 ml   Output 3075 ml   Net 1895 ml      Physical Exam   Constitutional: He is oriented to person, place, and time. He appears well-developed and well-nourished. No distress.   HENT:   Head: Normocephalic and atraumatic.   Right Ear: External ear normal.   Left Ear: External ear normal.   Nose: Nose normal.   Eyes: Right eye exhibits no discharge. Left eye exhibits no discharge.   Neck: Normal range of motion.   Cardiovascular: Normal rate, regular rhythm, normal heart sounds and intact distal pulses. Exam reveals no gallop and no friction rub.   No murmur heard.  Pulmonary/Chest: Effort normal and breath sounds normal. No stridor. No respiratory distress. He has no wheezes. He has no rales. He exhibits no tenderness.   Abdominal:   Soft, nondistended, nontender to palpation, bowel sounds normal, no rebound or guarding    Musculoskeletal: Normal range of motion. He exhibits no edema.   Neurological: He is alert and oriented to person, place, and time.   Skin: Skin is warm and dry. He is not diaphoretic. No erythema.   Psychiatric: He has a normal mood and affect. His behavior is normal. Judgment and thought content normal.   Nursing note and vitals reviewed.      Significant Labs:   CMP:   Recent Labs   Lab 12/05/19  0445   *   K 4.3      CO2 31*   GLU 85   BUN 10   CREATININE 0.9   CALCIUM 9.6   PROT 6.8   ALBUMIN 4.1   BILITOT 0.6   ALKPHOS 69   AST 19   ALT 35   ANIONGAP 10   EGFRNONAA >60     Lipase:   Recent Labs   Lab 12/04/19  1714   LIPASE 57       Significant Imaging: I have reviewed and interpreted all pertinent imaging results/findings within the past 24 hours.      Assessment/Plan:      * Acute pancreatitis  Patient has reported history of pancreatitis and felt that today was a repeat episode.  His lipase, however, was only 57.  Interestingly, his CT-abd/pelvis was significant for [e]dematous pancreatic head.  Of adjacent peripancreatic infiltration.  Findings are concerning for acute pancreatitis.  His LFTs are stable; lipid panel is pending.  Not sure how to explain the discordance between his lab findings and his imaging but he will be started on aggressive IV fluid hydration along with analgesia support.  Will consult Gastroenterology for further recommendations.    Tobacco abuse  Patient was counseled on smoking cessation and he will be provided a nicotine transdermal patch applied while inpatient.  Will provide additional smoking cessation counseling prior to discharge.    Essential hypertension  Patient's blood pressure is poorly-controlled; will continue home regimen of amlodipine and provide as-needed clonidine.      VTE Risk Mitigation (From admission, onward)         Ordered     Place VINAYAK hose  Until discontinued      12/04/19 2106     IP VTE LOW RISK PATIENT  Once      12/04/19 2106     Place  sequential compression device  Until discontinued      12/04/19 4712                      Ayse Mart MD  Department of Hospital Medicine   [facility]

## 2019-12-05 NOTE — HPI
Mr. Roverto Delgado is a 44 y.o. male with essential hypertension and tobacco abuse who presented initially to [facility] ED with complaints of abdominal pain for the past two months.  The pain is localized to the mid epigastrium without radiation and is sharp in quality and 10 out 10 in severity at its worst.  The pain has been intermittent and appears to be worsened on drinking alcohol and on eating spicy or fatty foods.  He had some nausea with nonbilious, nonbloody vomiting which prompted his presentation to the ED today.  The pain initially would last about 4 hour before resolving without intervention but in last few days it has been more constant.  He reports only drinking on Fridays after his work week and does not drink daily.  He denies any change in the color of his stools are his urine reports a previous episode three years ago.  He also denies any diarrhea, constipation, dysuria, hematuria, melena, hematochezia, nor any increased urinary frequency or urgency.  He does report some night sweats but denies any unintentional weight loss.    He went to his PCP at [facility] one week ago and underwent a right upper abdominal quadrant ultrasound--he is still waiting on the results.

## 2019-12-05 NOTE — HOSPITAL COURSE
Pt admitted with acute pancreatitis from [place] ED. On LR IVF and clear liquid diet.  He denies daily ETOH use. Pain controlled.     Pt tolerating diet, labs stable  Dc home   ETOH cessation recommended  Cardiac/low cholesterol diet recommended  Prilosec daily

## 2019-12-05 NOTE — PLAN OF CARE
Problem: Adult Inpatient Plan of Care  Goal: Plan of Care Review  Outcome: Ongoing, Progressing      Problem: Fall Injury Risk  Goal: Absence of Fall and Fall-Related Injury  Outcome: Ongoing, Progressing     Pt remained free of falls during current shift. Plan of care and fall precautions reviewed with patient.   Abd pain moderately controlled with prn IV morphine. IV fluid infusing. Able to tolerate clear liquids. No N/V during shift. Bed locked and in lowest postion. SR up x 2, call light in reach.

## 2019-12-05 NOTE — ASSESSMENT & PLAN NOTE
Patient's blood pressure is poorly-controlled; will continue home regimen of amlodipine and provide as-needed clonidine.

## 2019-12-06 VITALS
SYSTOLIC BLOOD PRESSURE: 126 MMHG | TEMPERATURE: 98 F | HEART RATE: 58 BPM | OXYGEN SATURATION: 99 % | WEIGHT: 191.13 LBS | BODY MASS INDEX: 28.97 KG/M2 | DIASTOLIC BLOOD PRESSURE: 79 MMHG | RESPIRATION RATE: 18 BRPM | HEIGHT: 68 IN

## 2019-12-06 PROCEDURE — S4991 NICOTINE PATCH NONLEGEND: HCPCS | Performed by: INTERNAL MEDICINE

## 2019-12-06 PROCEDURE — 63600175 PHARM REV CODE 636 W HCPCS: Performed by: HOSPITALIST

## 2019-12-06 PROCEDURE — 25000003 PHARM REV CODE 250: Performed by: INTERNAL MEDICINE

## 2019-12-06 PROCEDURE — 63600175 PHARM REV CODE 636 W HCPCS: Performed by: INTERNAL MEDICINE

## 2019-12-06 PROCEDURE — 94761 N-INVAS EAR/PLS OXIMETRY MLT: CPT

## 2019-12-06 RX ORDER — OMEPRAZOLE 20 MG/1
40 TABLET, DELAYED RELEASE ORAL DAILY
Qty: 30 TABLET | Refills: 0 | COMMUNITY
Start: 2019-12-06 | End: 2022-01-21

## 2019-12-06 RX ORDER — ONDANSETRON 4 MG/1
4 TABLET, FILM COATED ORAL EVERY 6 HOURS PRN
Qty: 14 TABLET | Refills: 0 | OUTPATIENT
Start: 2019-12-06 | End: 2022-01-21

## 2019-12-06 RX ORDER — HYDROCODONE BITARTRATE AND ACETAMINOPHEN 5; 325 MG/1; MG/1
1 TABLET ORAL EVERY 8 HOURS PRN
Qty: 20 TABLET | Refills: 0 | OUTPATIENT
Start: 2019-12-06 | End: 2022-01-21

## 2019-12-06 RX ADMIN — SODIUM CHLORIDE, SODIUM LACTATE, POTASSIUM CHLORIDE, AND CALCIUM CHLORIDE: .6; .31; .03; .02 INJECTION, SOLUTION INTRAVENOUS at 04:12

## 2019-12-06 RX ADMIN — GABAPENTIN 300 MG: 300 CAPSULE ORAL at 08:12

## 2019-12-06 RX ADMIN — MORPHINE SULFATE 4 MG: 10 INJECTION INTRAVENOUS at 08:12

## 2019-12-06 RX ADMIN — ONDANSETRON HYDROCHLORIDE 8 MG: 2 SOLUTION INTRAMUSCULAR; INTRAVENOUS at 12:12

## 2019-12-06 RX ADMIN — AMLODIPINE BESYLATE 10 MG: 5 TABLET ORAL at 08:12

## 2019-12-06 RX ADMIN — MORPHINE SULFATE 4 MG: 10 INJECTION INTRAVENOUS at 03:12

## 2019-12-06 RX ADMIN — NICOTINE 1 PATCH: 21 PATCH, EXTENDED RELEASE TRANSDERMAL at 08:12

## 2019-12-06 RX ADMIN — OXYCODONE HYDROCHLORIDE 5 MG: 5 TABLET ORAL at 04:12

## 2019-12-06 NOTE — PLAN OF CARE
Problem: Adult Inpatient Plan of Care  Goal: Plan of Care Review  Outcome: Ongoing, Progressing      Problem: Fall Injury Risk  Goal: Absence of Fall and Fall-Related Injury  Outcome: Ongoing, Progressing      Problem: Adult Inpatient Plan of Care  Goal: Optimal Comfort and Wellbeing  Outcome: Ongoing, Progressing    Pt remained free of falls during current shift. Plan of care and fall precautions reviewed with patient.   Abd pain moderately controlled with prn IV morphine. IV fluid infusing. Able to tolerate clear liquids. No N/V during shift. Bed locked and in lowest postion. SR up x 2, call light in reach.

## 2019-12-06 NOTE — PROGRESS NOTES
Chief Complaint / Reason for Consult:  Abdominal pain    Patient has been tolerating clear liquids, and his abdominal pain is improved    ROS:  No CP, SOB, F/C    Patient Vitals for the past 24 hrs:   BP Temp Temp src Pulse Resp SpO2   12/06/19 0725 121/86 98.1 °F (36.7 °C) Oral 62 18 99 %   12/06/19 0323 127/83 98 °F (36.7 °C) Oral 60 18 98 %   12/05/19 2326 118/72 97.6 °F (36.4 °C) Oral 60 18 98 %   12/05/19 1951 127/89 98.2 °F (36.8 °C) Oral 60 18 99 %   12/05/19 1554 (!) 152/84 98 °F (36.7 °C) Oral (!) 59 18 99 %   12/05/19 1105 133/82 98 °F (36.7 °C) Oral 77 18 96 %       Physical Exam:  Gen - Well developed, well nourished, no apparent distress  HEENT - Anicteric  CV - S1, S2, no murmurs/rubs  Lungs - CTA-B, normal excursion  Abd - Soft, NT, ND, normal BS's, no HSM.  Ext - No c/c/e  Neuro - No asterixis    Labs:  +H pylori antibody    Imaging:  Reviewed CT, consistent with acute pancreatitis    Assessment:  This patient is a 44 y.o. male with:   1.  Acute pancreatitis-likely alcohol etiology.  Clinically improving.  Triglycerides are not significantly elevated, he is status post cholecystectomy with normal LFTs.  2.  Abdominal pain-secondary to 1., clinically improving.  3.  History of HTN.....    Recommendations:  1.  Monitor abdominal exam.  2.  Alcohol cessation counseling.  3.  Okay to advance diet.  4.  If patient tolerates diet, okay to discharge home per GI.

## 2019-12-06 NOTE — NURSING
Discharge instructions given to patient . Patient verbalized understanding of instructions. Patient states willingness to comply. Saline lock removed. Tele monitoring removed. Pt states girlfriend is on her way to pick him up.

## 2019-12-06 NOTE — DISCHARGE SUMMARY
[facility] Hospital Medicine  Discharge Summary      Patient Name: Roverto Delgado  MRN: [id number]  Admission Date: 12/4/2019  Hospital Length of Stay: 2 days  Discharge Date and Time:  12/06/2019 11:25 AM  Attending Physician: Ayse Mart MD   Discharging Provider: Ayse Mart MD  Primary Care Provider: Primary Doctor No      HPI:   Mr. Roverto Delgado is a 44 y.o. male with essential hypertension and tobacco abuse who presented initially to [facility] ED with complaints of abdominal pain for the past two months.  The pain is localized to the mid epigastrium without radiation and is sharp in quality and 10 out 10 in severity at its worst.  The pain has been intermittent and appears to be worsened on drinking alcohol and on eating spicy or fatty foods.  He had some nausea with nonbilious, nonbloody vomiting which prompted his presentation to the ED today.  The pain initially would last about 4 hour before resolving without intervention but in last few days it has been more constant.  He reports only drinking on Fridays after his work week and does not drink daily.  He denies any change in the color of his stools are his urine reports a previous episode three years ago.  He also denies any diarrhea, constipation, dysuria, hematuria, melena, hematochezia, nor any increased urinary frequency or urgency.  He does report some night sweats but denies any unintentional weight loss.    He went to his PCP at [facility] one week ago and underwent a right upper abdominal quadrant ultrasound--he is still waiting on the results.    * No surgery found *      Hospital Course:   Pt admitted with acute pancreatitis from [facility] ED. On LR IVF and clear liquid diet.  He denies daily ETOH use. Pain controlled.     Pt tolerating diet, labs stable  Dc home   ETOH cessation recommended  Cardiac/low cholesterol diet recommended  Prilosec daily          Consults:   Consults (From admission,  onward)        Status Ordering Provider     Inpatient consult to Gastroenterology  Once     Provider:  Jose Francisco Rogers MD    Completed GEORGINA MUNIZ          No new Assessment & Plan notes have been filed under this hospital service since the last note was generated.  Service: Hospital Medicine    Final Active Diagnoses:    Diagnosis Date Noted POA    PRINCIPAL PROBLEM:  Acute pancreatitis [K85.90] 12/04/2019 Yes    Essential hypertension [I10] 12/04/2019 Yes     Chronic    Tobacco abuse [Z72.0] 12/04/2019 Yes     Chronic      Problems Resolved During this Admission:       Discharged Condition: good    Disposition: Home or Self Care    Follow Up:    Patient Instructions:      Diet Cardiac     Notify your health care provider if you experience any of the following:  temperature >100.4     Notify your health care provider if you experience any of the following:  severe uncontrolled pain     Notify your health care provider if you experience any of the following:  persistent nausea and vomiting or diarrhea     Activity as tolerated       Significant Diagnostic Studies:   Impression/ Abdominal CT        Edematous pancreatic head.  Of adjacent peripancreatic infiltration.  Findings are concerning for acute pancreatitis.  Recommend correlation with amylase and lipase.    Mildly lobular margins of the liver.  Enlargement of the spleen.  Findings may suggest the stigmata of cirrhosis.  Consider correlation with liver function tests.    Subcentimeter right hepatic cysts.         Pending Diagnostic Studies:     None         Medications:  Reconciled Home Medications:      Medication List      START taking these medications    HYDROcodone-acetaminophen 5-325 mg per tablet  Commonly known as:  NORCO  Take 1 tablet by mouth every 8 (eight) hours as needed for Pain.     omeprazole 20 MG tablet  Commonly known as:  PriLOSEC OTC  Take 2 tablets (40 mg total) by mouth once daily.        CONTINUE taking these medications    amLODIPine  10 MG tablet  Commonly known as:  NORVASC  Take 10 mg by mouth once daily.     gabapentin 300 MG capsule  Commonly known as:  NEURONTIN  Take 300 mg by mouth 3 (three) times daily.            Indwelling Lines/Drains at time of discharge:   Lines/Drains/Airways     None                 Time spent on the discharge of patient: 40 minutes  Patient was seen and examined on the date of discharge and determined to be suitable for discharge.         Ayse Mart MD  Department of Hospital Medicine  [facility]

## 2019-12-06 NOTE — PLAN OF CARE
"To patient's room to discuss patient managing his care at home.      TN Role Explained.  Patient identified by using 2 identifiers:  Name and date of birth    Patient stated that his girlfriend and parents WILL HELP AT HOME WITH his RECOVERY.       TN reviewed with patient contents of "lark Packet".      TN name and contact info placed on the communication board    Preferred Pharmacy:  [id number] [address] [place] [address]  Phone: [phone] Fax: [phone]       12/05/19 1500   Discharge Assessment   Assessment Type Discharge Planning Reassessment   Confirmed/corrected address and phone number on facesheet? Yes   Assessment information obtained from? Patient   Expected Length of Stay (days) 2   Communicated expected length of stay with patient/caregiver yes   Prior to hospitilization cognitive status: Alert/Oriented   Prior to hospitalization functional status: Independent   Current cognitive status: Alert/Oriented   Current Functional Status: Independent   Lives With parent(s)   Able to Return to Prior Arrangements yes   Is patient able to care for self after discharge? Yes   Patient's perception of discharge disposition home or selfcare   Readmission Within the Last 30 Days no previous admission in last 30 days   Patient currently being followed by outpatient case management? No   Patient currently receives any other outside agency services? No   Equipment Currently Used at Home none   Part D Coverage N/A   Do you have any problems affording any of your prescribed medications? No   Is the patient taking medications as prescribed? yes   Does the patient have transportation home? Yes   Transportation Anticipated family or friend will provide   Does the patient receive services at the [facility]? No   Discharge Plan A Home with family   DME Needed Upon Discharge  none   Patient/Family in Agreement with " Plan yes

## 2019-12-06 NOTE — NURSING
Patient escorted by RN to family vehicle for discharge home. Patient accompanied by girlfriend. No apparent distress noted.

## 2019-12-06 NOTE — PROGRESS NOTES
[facility] CASE MANAGEMENT                  WRITTEN DISCHARGE INFORMATION      APPOINTMENTS AND RESOURCES TO HELP YOU MANAGE YOUR CARE AT HOME BASED ON YOUR PREFERENCES:  (If an appointment is not scheduled for you when you leave the hospital, call your doctor to schedule a follow up visit within a week)    Follow-up Information     [facility] On 12/10/2019.    Why:  Keep appointment as previously scheduled  Contact information:  [address]  [facility] [address]  [phone]                   Healthy Living Instructions to HELP MANAGE YOUR CARE AT HOME:  Things You are responsible for:  1.    Getting your prescriptions filled   2.    Taking your medications as directed, DO NOT MISS ANY DOSES!  3.    Following the diet and exercise recommended by your doctor  4.    Going to your follow-up doctor appointment. This is important because it allows the doctor to monitor your progress and determine if any changes need to made to your treatment plan.  5. If you have any questions about MANAGING YOUR CARE AT HOME Call the Nurse Care Line for 24/7 Assistance [phone]       Please answer any calls you may receive from [facility]. We want to continue to support you as you manage your healthcare needs. [facility] is happy to have the opportunity to serve you.      Thank you for choosing [facility] for your healthcare needs!  Your [facility] Case Management Team,

## 2019-12-06 NOTE — PLAN OF CARE
12/06/19 1247   Post-Acute Status   Post-Acute Authorization Other   Part D Coverage n/a   Other Status No Post-Acute Service Needs   Discharge Delays None known at this time

## 2019-12-06 NOTE — PLAN OF CARE
"   12/06/19 1255   Final Note   Assessment Type Final Discharge Note   Anticipated Discharge Disposition Home   Hospital Follow Up  Appt(s) scheduled? Yes   Discharge plans and expectations educations in teach back method with documentation complete? Yes   Right Care Referral Info   Post Acute Recommendation No Care   Nurse, Ellen notified that all CM needs are met    EDUCATION:  Mr Layne provided with educational information on GI.  Information reviewed and placed in :My Healthcare Packet" to be brought home for him to use as resource after discharge.  Information included:  signs and symptoms to look for and call the doctor if experiencing, and symptoms that may indicate a medical emergency: CALL 911.      All questions answered.  Teach back method used.    Patient stated, "If I bleed from my mouth and return and it won't stop call 911".        "

## 2019-12-10 ENCOUNTER — PATIENT OUTREACH (OUTPATIENT)
Dept: ADMINISTRATIVE | Facility: CLINIC | Age: 44
End: 2019-12-10

## 2019-12-10 NOTE — PATIENT INSTRUCTIONS
Discharge Instructions for Acute Pancreatitis  You have been diagnosed with acute pancreatitis. Your pancreas is inflamed or swollen. The pancreas is an organ that makes digestive juices and hormones. Gallstones are a common cause of pancreatitis. These hard stones form in the gallbladder. The gallbladder shares a tube with the pancreas into the small intestine. If gallstones block this tube, fluid cant leave the pancreas. The fluid backs up and causes redness and swelling (inflammation). There are other causes of pancreatitis. Make sure you understand the cause of your pancreatitis. Then you can try to stop it from happening again.  Immediate home care  · Find someone to drive you to appointments. Acute pancreatitis is a serious condition, and you should never drive if you are experiencing symptoms.  · Stop drinking if your illness was caused by alcohol.  ¨ Ask your healthcare provider about alcohol abuse programs and support groups such as Alcoholics Anonymous.  ¨ Ask your provider about prescription medicines that can help you stop drinking.  ¨ Tell your provider about the alcohol withdrawal symptoms you have when you stop drinking. This is very important. You may need close medical supervision and special medicines when you stop drinking. This will depend on your alcohol withdrawal history.   · Take your medicines exactly as directed. Dont skip doses.  · Eat a low-fat diet. Ask your provider for menus and other diet information.  · Learn to take your own pulse. Keep a record of your results. Ask your provider which readings mean that you need medical attention.  Ongoing care  · Tell your provider about any medicines you are taking. Some medicines can cause this condition.  · Before starting any new medicine, ask your provider if it will harm your pancreas. This includes any new over-the-counter medicines, vitamins, or herbal supplements.    · Tell your provider if you lose weight without dieting.  · Be aware  of symptoms that may mean your pancreatitis has come back. These symptoms include belly pain, nausea and vomiting, and fever.  · Keep all follow-up appointments with your provider. Problems can often show up later.  Follow-up  Follow up with your healthcare provider, or as advised.  When to call your provider  Call your healthcare provider right away if you have any of the following:  · Fever of 100.4°F (38.0°C) or higher, or as advised by your provider  · Severe pain from your upper belly to your back  · Nausea and vomiting  · Feely dizzy or lightheaded  · Yellowing of your skin or eyes (jaundice)  · Bruises on your belly or back  · Belly swelling and tenderness  · Rapid pulse  · Shallow, fast breathing   Date Last Reviewed: 8/1/2016  © 4927-6675 [employer]. [address] [place] [address], [place] [address]. All rights reserved. This information is not intended as a substitute for professional medical care. Always follow your healthcare professional's instructions.

## 2020-08-01 ENCOUNTER — HOSPITAL ENCOUNTER (EMERGENCY)
Facility: HOSPITAL | Age: 45
Discharge: HOME | End: 2020-08-01
Attending: EMERGENCY MEDICINE
Payer: MEDICAID

## 2020-08-01 VITALS
HEIGHT: 68 IN | HEART RATE: 80 BPM | WEIGHT: 195 LBS | OXYGEN SATURATION: 98 % | RESPIRATION RATE: 15 BRPM | DIASTOLIC BLOOD PRESSURE: 78 MMHG | BODY MASS INDEX: 29.55 KG/M2 | SYSTOLIC BLOOD PRESSURE: 128 MMHG | TEMPERATURE: 99 F

## 2020-08-01 DIAGNOSIS — S16.1XXA STRAIN OF NECK MUSCLE, INITIAL ENCOUNTER: Primary | ICD-10-CM

## 2020-08-01 DIAGNOSIS — M54.2 NECK PAIN: ICD-10-CM

## 2020-08-01 PROCEDURE — 25000003 PHARM REV CODE 250: Mod: ER | Performed by: EMERGENCY MEDICINE

## 2020-08-01 PROCEDURE — 99284 EMERGENCY DEPT VISIT MOD MDM: CPT | Mod: 25,ER

## 2020-08-01 RX ORDER — KETOROLAC TROMETHAMINE 10 MG/1
10 TABLET, FILM COATED ORAL EVERY 6 HOURS PRN
Qty: 12 TABLET | Refills: 0 | Status: SHIPPED | OUTPATIENT
Start: 2020-08-01 | End: 2020-08-04

## 2020-08-01 RX ORDER — LISINOPRIL AND HYDROCHLOROTHIAZIDE 12.5; 2 MG/1; MG/1
1 TABLET ORAL DAILY
COMMUNITY
Start: 2020-03-01

## 2020-08-01 RX ORDER — KETOROLAC TROMETHAMINE 10 MG/1
10 TABLET, FILM COATED ORAL
Status: COMPLETED | OUTPATIENT
Start: 2020-08-01 | End: 2020-08-01

## 2020-08-01 RX ORDER — PANTOPRAZOLE SODIUM 40 MG/1
40 TABLET, DELAYED RELEASE ORAL DAILY
COMMUNITY

## 2020-08-01 RX ORDER — FENOFIBRATE 160 MG/1
160 TABLET ORAL DAILY
COMMUNITY

## 2020-08-01 RX ORDER — METHOCARBAMOL 750 MG/1
1500 TABLET, FILM COATED ORAL EVERY 6 HOURS
Qty: 24 TABLET | Refills: 0 | Status: SHIPPED | OUTPATIENT
Start: 2020-08-01 | End: 2020-08-04

## 2020-08-01 RX ORDER — METHOCARBAMOL 750 MG/1
1500 TABLET, FILM COATED ORAL
Status: COMPLETED | OUTPATIENT
Start: 2020-08-01 | End: 2020-08-01

## 2020-08-01 RX ADMIN — METHOCARBAMOL 1500 MG: 750 TABLET ORAL at 08:08

## 2020-08-01 RX ADMIN — KETOROLAC TROMETHAMINE 10 MG: 10 TABLET, FILM COATED ORAL at 08:08

## 2020-08-02 NOTE — ED PROVIDER NOTES
"Encounter Date: 8/1/2020    SCRIBE #1 NOTE: I, Angelia Rashid, am scribing for, and in the presence of,  Dr. Torres. I have scribed the following portions of the note - Other sections scribed: HPI, ROS, PE.       History     Chief Complaint   Patient presents with    Neck Pain     Chronic neck pain, hand and bilateral leg cramping x 6 months worsening today, states "I feel like my legs lock up on me"     Roverto Delgado is a 45 y.o. male who presents to the ED complaining of chronic neck pain, fingers pain and bilateral leg cramping for the last 6 months worsening today. He report feeling like his toes and fingers lock up on him. 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  He denies any known injury, trauma or fall. Denies any numbness, weakness, gait problem, saddle anesthesia or urinary retention. Pt reports he has an appointment with his PCP in 2 days.     The history is provided by the patient. No  was used.     Review of patient's allergies indicates:  No Known Allergies  Past Medical History:   Diagnosis Date    Hypertension     Pancreatitis      Past Surgical History:   Procedure Laterality Date    CHOLECYSTECTOMY       History reviewed. No pertinent family history.  Social History     Tobacco Use    Smoking status: Current Some Day Smoker    Smokeless tobacco: Current User     Types: Snuff   Substance Use Topics    Alcohol use: Not Currently     Comment: occasionally    Drug use: Never     Review of Systems   Cardiovascular: Negative for leg swelling.   Musculoskeletal: Positive for arthralgias, myalgias and neck pain. Negative for gait problem.   Skin: Negative for wound.   Neurological: Negative for weakness and numbness.   All other systems reviewed and are negative.      Physical Exam     Initial Vitals [08/01/20 1847]   BP Pulse Resp Temp SpO2   109/66 98 17 98.6 °F (37 °C) 96 %      MAP       --     "     Physical Exam    Nursing note and vitals reviewed.  Constitutional: He appears well-developed and well-nourished.   HENT:   Head: Normocephalic and atraumatic.   Nose: Nose normal.   Eyes: Conjunctivae are normal.   Neck: Normal range of motion and phonation normal. Neck supple. No stridor present.   Cardiovascular: Normal rate, regular rhythm, normal heart sounds and intact distal pulses.   Pulmonary/Chest: Effort normal and breath sounds normal. No stridor. No respiratory distress.   Abdominal: Soft. Normal appearance and bowel sounds are normal.   Musculoskeletal: Normal range of motion. No edema.      Cervical back: He exhibits tenderness. He exhibits normal range of motion, no bony tenderness, no swelling, no deformity, no laceration, no spasm and normal pulse.      Comments: Bilateral paraspinal tenderness in the cervical back.   Neurological: He is alert and oriented to person, place, and time. He has normal strength. He displays no atrophy and no tremor. He displays no seizure activity. Gait normal.   Skin: Skin is warm and dry.   Psychiatric: He has a normal mood and affect. His behavior is normal.         ED Course   Procedures  Labs Reviewed - No data to display       Imaging Results          X-Ray Cervical Spine AP And Lateral (Final result)  Result time 08/01/20 21:36:05    Final result by Robert Malik MD (08/01/20 21:36:05)                 Impression:      No acute cervical spine abnormalities identified.      Electronically signed by: Robert Malik MD  Date:    08/01/2020  Time:    21:36             Narrative:    EXAMINATION:  XR CERVICAL SPINE AP LATERAL    CLINICAL HISTORY:  Cervicalgia    TECHNIQUE:  AP, lateral and open mouth views of the cervical spine were performed.    COMPARISON:  None.    FINDINGS:  No evidence of acute cervical spine fracture or subluxation.  Cervical spine alignment is within normal limits.  Odontoid process appears intact.  Surrounding soft tissues show no  significant abnormalities.                                 Medical Decision Making:   History:   Old Medical Records: I decided to obtain old medical records.  Clinical Tests:   Radiological Study: Ordered and Reviewed    Labs Reviewed       Imaging Reviewed    Imaging Results          X-Ray Cervical Spine AP And Lateral (Final result)  Result time 08/01/20 21:36:05    Final result by Robert Malik MD (08/01/20 21:36:05)                 Impression:      No acute cervical spine abnormalities identified.      Electronically signed by: Robert Malik MD  Date:    08/01/2020  Time:    21:36             Narrative:    EXAMINATION:  XR CERVICAL SPINE AP LATERAL    CLINICAL HISTORY:  Cervicalgia    TECHNIQUE:  AP, lateral and open mouth views of the cervical spine were performed.    COMPARISON:  None.    FINDINGS:  No evidence of acute cervical spine fracture or subluxation.  Cervical spine alignment is within normal limits.  Odontoid process appears intact.  Surrounding soft tissues show no significant abnormalities.                                Medications given in ED    Medications   ketorolac tablet 10 mg (10 mg Oral Given 8/1/20 2044)   methocarbamoL tablet 1,500 mg (1,500 mg Oral Given 8/1/20 2044)       Scribe Attestation:   Scribe #1: I performed the above scribed service and the documentation accurately describes the services I performed. I attest to the accuracy of the note.    This document was produced by a scribe under my direction and in my presence. I agree with the content of the note and have made any necessary edits.     Amado Torres MD         Note was created using voice recognition software. Note may have occasional typographical errors that may not have been identified and edited despite good mich initial review prior to signing.                                  Clinical Impression:     1. Strain of neck muscle, initial encounter    2. Neck pain                ED Disposition Condition     Discharge Stable        ED Prescriptions     Medication Sig Dispense Start Date End Date Auth. Provider    ketorolac (TORADOL) 10 mg tablet Take 1 tablet (10 mg total) by mouth every 6 (six) hours as needed for Pain (take with food). 12 tablet 8/1/2020 8/4/2020 Amado Torres MD    methocarbamoL (ROBAXIN) 750 MG Tab Take 2 tablets (1,500 mg total) by mouth every 6 (six) hours. for 3 days 24 tablet 8/1/2020 8/4/2020 Amado Torres MD        Follow-up Information     Follow up With Specialties Details Why Contact Info    Your PCP  Go on 8/3/2020 as previously scheduled                                      Amado Torres MD  08/02/20 3086

## 2021-12-29 ENCOUNTER — TELEPHONE (OUTPATIENT)
Dept: ORTHOPEDICS | Facility: CLINIC | Age: 46
End: 2021-12-29
Payer: MEDICAID

## 2021-12-29 DIAGNOSIS — M25.561 ACUTE PAIN OF RIGHT KNEE: Primary | ICD-10-CM

## 2022-01-04 ENCOUNTER — TELEPHONE (OUTPATIENT)
Dept: ORTHOPEDICS | Facility: CLINIC | Age: 47
End: 2022-01-04
Payer: MEDICAID

## 2022-01-05 ENCOUNTER — OFFICE VISIT (OUTPATIENT)
Dept: ORTHOPEDICS | Facility: CLINIC | Age: 47
End: 2022-01-05
Payer: MEDICAID

## 2022-01-05 ENCOUNTER — HOSPITAL ENCOUNTER (OUTPATIENT)
Dept: RADIOLOGY | Facility: HOSPITAL | Age: 47
Discharge: HOME | End: 2022-01-05
Attending: ORTHOPAEDIC SURGERY
Payer: MEDICAID

## 2022-01-05 VITALS
HEIGHT: 68 IN | DIASTOLIC BLOOD PRESSURE: 77 MMHG | BODY MASS INDEX: 29.75 KG/M2 | WEIGHT: 196.31 LBS | SYSTOLIC BLOOD PRESSURE: 117 MMHG | HEART RATE: 71 BPM

## 2022-01-05 DIAGNOSIS — M25.561 ACUTE PAIN OF RIGHT KNEE: ICD-10-CM

## 2022-01-05 DIAGNOSIS — M17.11 PRIMARY OSTEOARTHRITIS OF RIGHT KNEE: Primary | ICD-10-CM

## 2022-01-05 PROCEDURE — 1159F MED LIST DOCD IN RCRD: CPT | Mod: CPTII,,, | Performed by: ORTHOPAEDIC SURGERY

## 2022-01-05 PROCEDURE — 99999 PR PBB SHADOW E&M-EST. PATIENT-LVL III: CPT | Mod: PBBFAC,,, | Performed by: ORTHOPAEDIC SURGERY

## 2022-01-05 PROCEDURE — 73564 X-RAY EXAM KNEE 4 OR MORE: CPT | Mod: 26,RT,, | Performed by: RADIOLOGY

## 2022-01-05 PROCEDURE — 73564 XR KNEE COMP 4 OR MORE VIEWS RIGHT: ICD-10-PCS | Mod: 26,RT,, | Performed by: RADIOLOGY

## 2022-01-05 PROCEDURE — 99999 PR PBB SHADOW E&M-EST. PATIENT-LVL III: ICD-10-PCS | Mod: PBBFAC,,, | Performed by: ORTHOPAEDIC SURGERY

## 2022-01-05 PROCEDURE — 3078F DIAST BP <80 MM HG: CPT | Mod: CPTII,,, | Performed by: ORTHOPAEDIC SURGERY

## 2022-01-05 PROCEDURE — 3078F PR MOST RECENT DIASTOLIC BLOOD PRESSURE < 80 MM HG: ICD-10-PCS | Mod: CPTII,,, | Performed by: ORTHOPAEDIC SURGERY

## 2022-01-05 PROCEDURE — 3074F PR MOST RECENT SYSTOLIC BLOOD PRESSURE < 130 MM HG: ICD-10-PCS | Mod: CPTII,,, | Performed by: ORTHOPAEDIC SURGERY

## 2022-01-05 PROCEDURE — 99204 OFFICE O/P NEW MOD 45 MIN: CPT | Mod: 25,S$PBB,, | Performed by: ORTHOPAEDIC SURGERY

## 2022-01-05 PROCEDURE — 99213 OFFICE O/P EST LOW 20 MIN: CPT | Mod: PBBFAC,PN,25 | Performed by: ORTHOPAEDIC SURGERY

## 2022-01-05 PROCEDURE — 1160F PR REVIEW ALL MEDS BY PRESCRIBER/CLIN PHARMACIST DOCUMENTED: ICD-10-PCS | Mod: CPTII,,, | Performed by: ORTHOPAEDIC SURGERY

## 2022-01-05 PROCEDURE — 1160F RVW MEDS BY RX/DR IN RCRD: CPT | Mod: CPTII,,, | Performed by: ORTHOPAEDIC SURGERY

## 2022-01-05 PROCEDURE — 20610 DRAIN/INJ JOINT/BURSA W/O US: CPT | Mod: PBBFAC,PN,RT | Performed by: ORTHOPAEDIC SURGERY

## 2022-01-05 PROCEDURE — 20610 LARGE JOINT ASPIRATION/INJECTION: R KNEE: ICD-10-PCS | Mod: S$PBB,RT,, | Performed by: ORTHOPAEDIC SURGERY

## 2022-01-05 PROCEDURE — 3074F SYST BP LT 130 MM HG: CPT | Mod: CPTII,,, | Performed by: ORTHOPAEDIC SURGERY

## 2022-01-05 PROCEDURE — 1159F PR MEDICATION LIST DOCUMENTED IN MEDICAL RECORD: ICD-10-PCS | Mod: CPTII,,, | Performed by: ORTHOPAEDIC SURGERY

## 2022-01-05 PROCEDURE — 3008F BODY MASS INDEX DOCD: CPT | Mod: CPTII,,, | Performed by: ORTHOPAEDIC SURGERY

## 2022-01-05 PROCEDURE — 3008F PR BODY MASS INDEX (BMI) DOCUMENTED: ICD-10-PCS | Mod: CPTII,,, | Performed by: ORTHOPAEDIC SURGERY

## 2022-01-05 PROCEDURE — 99204 PR OFFICE/OUTPT VISIT, NEW, LEVL IV, 45-59 MIN: ICD-10-PCS | Mod: 25,S$PBB,, | Performed by: ORTHOPAEDIC SURGERY

## 2022-01-05 PROCEDURE — 73564 X-RAY EXAM KNEE 4 OR MORE: CPT | Mod: TC,FY,RT

## 2022-01-05 RX ORDER — TRIAMCINOLONE ACETONIDE 40 MG/ML
40 INJECTION, SUSPENSION INTRA-ARTICULAR; INTRAMUSCULAR
Status: DISCONTINUED | OUTPATIENT
Start: 2022-01-05 | End: 2022-01-05 | Stop reason: HOSPADM

## 2022-01-05 RX ADMIN — TRIAMCINOLONE ACETONIDE 40 MG: 40 INJECTION, SUSPENSION INTRA-ARTICULAR; INTRAMUSCULAR at 09:01

## 2022-01-05 NOTE — PROGRESS NOTES
Patient ID:   Roverto Delgado is a 46 y.o. male.    Chief Complaint:   Right knee pain    HPI:   The patient is a 46-year-old male who presents with right knee pain.  Pain intensity is 4/10.  Pain is present over the anterior medial and medial aspects of the knee.  Duration of pain has been over 1 year.  He denies any recent injury.  He does report an injury at work about 3 years ago when she slipped and fell landing directly onto the right knee.  Pain is worse when he is active, standing and walking.  He gets some relief with rest.  Past treatment has included arthrocentesis, meloxicam, and physical therapy.    Medications:    Current Outpatient Medications:     amLODIPine (NORVASC) 10 MG tablet, Take 10 mg by mouth once daily., Disp: , Rfl:     fenofibrate 160 MG Tab, Take 160 mg by mouth once daily., Disp: , Rfl:     gabapentin (NEURONTIN) 300 MG capsule, Take 300 mg by mouth 3 (three) times daily., Disp: , Rfl:     pantoprazole (PROTONIX) 40 MG tablet, Take 40 mg by mouth once daily., Disp: , Rfl:     HYDROcodone-acetaminophen (NORCO) 5-325 mg per tablet, Take 1 tablet by mouth every 8 (eight) hours as needed for Pain. (Patient not taking: No sig reported), Disp: 20 tablet, Rfl: 0    lisinopriL-hydrochlorothiazide (PRINZIDE,ZESTORETIC) 20-12.5 mg per tablet, Take 1 tablet by mouth once daily., Disp: , Rfl:     omeprazole (PRILOSEC OTC) 20 MG tablet, Take 2 tablets (40 mg total) by mouth once daily. (Patient not taking: Reported on 8/1/2020), Disp: 30 tablet, Rfl: 0    ondansetron (ZOFRAN) 4 MG tablet, Take 1 tablet (4 mg total) by mouth every 6 (six) hours as needed for Nausea. (Patient not taking: Reported on 1/5/2022), Disp: 14 tablet, Rfl: 0    Allergies:  Review of patient's allergies indicates:  No Known Allergies    Past Medical History:  Past Medical History:   Diagnosis Date    Hypertension     Pancreatitis         Past Surgical History:  Past Surgical History:   Procedure Laterality Date     "CHOLECYSTECTOMY         Social History:  Social History     Occupational History    Not on file   Tobacco Use    Smoking status: Current Some Day Smoker    Smokeless tobacco: Current User     Types: Snuff   Substance and Sexual Activity    Alcohol use: Not Currently     Comment: occasionally    Drug use: Never    Sexual activity: Not on file       Family History:  History reviewed. No pertinent family history.     ROS:  Review of Systems   Musculoskeletal: Positive for arthritis, falls, joint pain and joint swelling.   All other systems reviewed and are negative.      Vitals:  /77   Pulse 71   Ht 5' 8" (1.727 m)   Wt 89 kg (196 lb 5.1 oz)   BMI 29.85 kg/m²     Physical Examination:  Comprehensive Orthopaedic Musculoskeletal Exam    General        Constitutional: appears stated age, well-developed and well-nourished    Scleral icterus: no    Labored breathing: no    Psychiatric: normal mood and affect and no acute distress    Neurological: alert and oriented x3    Skin: intact    Lymphadenopathy: none     Ortho Exam   Right knee examination:  No effusion.  There is tenderness to palpation along the medial joint line and the anterior medial patellar facet.  Negative patellar grind.  Range of motion on the right is from 0-135 degrees.  Range of motion on the left is 0-145 degrees.  Negative Rachael's.  No instability to varus and valgus stress.    Imaging:  I have ordered and independently reviewed the following imaging studies performed at [facility] today  Right x-ray series demonstrates mild medial compartment and patellofemoral compartment narrowing with peripheral osteophyte formation present.    Assessment:  1. Primary osteoarthritis of right knee        Plan:  The radiographic findings were discussed in detail with the patient.  I have offered him a cortisone injection.  He wishes to proceed with the injection today.  He will return as needed.     No follow-ups on file.        "

## 2022-01-05 NOTE — PROCEDURES
Large Joint Aspiration/Injection: R knee    Date/Time: 1/5/2022 9:00 AM  Performed by: Gregg Rodriguez MD  Authorized by: Gregg Rodriguez MD     Consent Done?:  Yes (Verbal)  Indications:  Arthritis  Site marked: the procedure site was marked    Timeout: prior to procedure the correct patient, procedure, and site was verified    Prep: patient was prepped and draped in usual sterile fashion      Local anesthesia used?: Yes    Local anesthetic:  Topical anesthetic and lidocaine 1% without epinephrine  Anesthetic total (ml):  4      Details:  Needle Size:  22 G  Ultrasonic Guidance for needle placement?: No    Approach:  Anteromedial  Location:  Knee  Site:  R knee  Medications:  40 mg triamcinolone acetonide 40 mg/mL  Patient tolerance:  Patient tolerated the procedure well with no immediate complications     A mixture containing 4 cc of 1% lidocaine and 1 cc of Kenalog (40mg/cc) was injected.

## 2022-01-21 ENCOUNTER — HOSPITAL ENCOUNTER (EMERGENCY)
Facility: HOSPITAL | Age: 47
Discharge: HOME | End: 2022-01-21
Attending: EMERGENCY MEDICINE
Payer: MEDICAID

## 2022-01-21 VITALS
TEMPERATURE: 98 F | SYSTOLIC BLOOD PRESSURE: 130 MMHG | DIASTOLIC BLOOD PRESSURE: 77 MMHG | WEIGHT: 200 LBS | RESPIRATION RATE: 16 BRPM | HEART RATE: 84 BPM | HEIGHT: 68 IN | OXYGEN SATURATION: 98 % | BODY MASS INDEX: 30.31 KG/M2

## 2022-01-21 DIAGNOSIS — R21 RASH: Primary | ICD-10-CM

## 2022-01-21 PROCEDURE — 25000003 PHARM REV CODE 250: Mod: ER | Performed by: NURSE PRACTITIONER

## 2022-01-21 PROCEDURE — 99284 EMERGENCY DEPT VISIT MOD MDM: CPT | Mod: ER

## 2022-01-21 RX ORDER — DOXYCYCLINE 100 MG/1
100 CAPSULE ORAL 2 TIMES DAILY
Qty: 20 CAPSULE | Refills: 0 | Status: SHIPPED | OUTPATIENT
Start: 2022-01-21 | End: 2022-01-31

## 2022-01-21 RX ORDER — DOXYCYCLINE HYCLATE 100 MG
100 TABLET ORAL
Status: COMPLETED | OUTPATIENT
Start: 2022-01-21 | End: 2022-01-21

## 2022-01-21 RX ORDER — SULINDAC 150 MG/1
150 TABLET ORAL 2 TIMES DAILY
Qty: 10 TABLET | Refills: 0 | Status: SHIPPED | OUTPATIENT
Start: 2022-01-21 | End: 2022-02-21

## 2022-01-21 RX ORDER — NAPROXEN 250 MG/1
250 TABLET ORAL
Status: COMPLETED | OUTPATIENT
Start: 2022-01-21 | End: 2022-01-21

## 2022-01-21 RX ORDER — MUPIROCIN 20 MG/G
OINTMENT TOPICAL 3 TIMES DAILY
Qty: 45 G | Refills: 0 | Status: SHIPPED | OUTPATIENT
Start: 2022-01-21 | End: 2022-02-21

## 2022-01-21 RX ORDER — MUPIROCIN 20 MG/G
OINTMENT TOPICAL
Status: COMPLETED | OUTPATIENT
Start: 2022-01-21 | End: 2022-01-21

## 2022-01-21 RX ADMIN — DOXYCYCLINE HYCLATE 100 MG: 100 TABLET, COATED ORAL at 07:01

## 2022-01-21 RX ADMIN — MUPIROCIN: 20 OINTMENT TOPICAL at 07:01

## 2022-01-21 RX ADMIN — NAPROXEN 250 MG: 250 TABLET ORAL at 07:01

## 2022-01-22 NOTE — ED PROVIDER NOTES
"Encounter Date: 1/21/2022    SCRIBE #1 NOTE: I, Dony Rodriguez, am scribing for, and in the presence of,  Gordon Figueredo DNP. I have scribed the following portions of the note - Other sections scribed: HPI, ROS, PE.       History     Chief Complaint   Patient presents with    Rash     Complains of painful rash to middle of back since this morning. Unable to assess at triage.     Roverto Delgado is a 46 y.o. male with a PMHx of hypertension and pancreatitis who presents to the ED for evaluation of a painful rash to the back, onset 3 PM today.  Pt notes that the rash feels "wet".  Pt notes use of Gabapentin daily.  Pt states that he recently ate crawfish.  Pt denies taking any new medications recently.  Pt denies using any new soaps, creams, lotions, or other similar products.  Pt denies any other associated symptoms.    The history is provided by the patient. No  was used.     Review of patient's allergies indicates:  No Known Allergies  Past Medical History:   Diagnosis Date    Hypertension     Pancreatitis      Past Surgical History:   Procedure Laterality Date    CHOLECYSTECTOMY       History reviewed. No pertinent family history.  Social History     Tobacco Use    Smoking status: Current Some Day Smoker    Smokeless tobacco: Current User     Types: Snuff   Substance Use Topics    Alcohol use: Not Currently     Comment: occasionally    Drug use: Never     Review of Systems   Constitutional: Negative for appetite change, chills, diaphoresis, fatigue and fever.   HENT: Negative for congestion, ear discharge, ear pain, postnasal drip, rhinorrhea, sinus pressure, sneezing, sore throat and voice change.    Eyes: Negative for discharge, itching and visual disturbance.   Respiratory: Negative for cough, shortness of breath and wheezing.    Cardiovascular: Negative for chest pain, palpitations and leg swelling.   Gastrointestinal: Negative for abdominal pain, nausea and vomiting.   Endocrine: " Negative for polydipsia, polyphagia and polyuria.   Genitourinary: Negative for difficulty urinating, dysuria, frequency, hematuria, penile discharge, penile pain, penile swelling and urgency.   Musculoskeletal: Negative for arthralgias and myalgias.   Skin: Positive for rash. Negative for wound.   Neurological: Negative for dizziness, seizures, syncope and weakness.   Hematological: Negative for adenopathy. Does not bruise/bleed easily.   Psychiatric/Behavioral: Negative for agitation and self-injury. The patient is not nervous/anxious.    All other systems reviewed and are negative.      Physical Exam     Initial Vitals   BP Pulse Resp Temp SpO2   01/21/22 1755 01/21/22 1754 01/21/22 1754 01/21/22 1754 01/21/22 1754   135/78 80 16 98.1 °F (36.7 °C) 97 %      MAP       --                Physical Exam    Constitutional: He appears well-developed and well-nourished.   HENT:   Head: Normocephalic and atraumatic.   Right Ear: Tympanic membrane, external ear and ear canal normal.   Left Ear: Tympanic membrane, external ear and ear canal normal.   Eyes: Conjunctivae and EOM are normal. Pupils are equal, round, and reactive to light.   Neck: Neck supple.   Normal range of motion.  Cardiovascular: Normal rate, regular rhythm and normal heart sounds. Exam reveals no gallop.    No murmur heard.  Pulmonary/Chest: Breath sounds normal. No respiratory distress. He has no wheezes. He has no rhonchi. He has no rales.   Abdominal: Abdomen is soft. Bowel sounds are normal. There is no abdominal tenderness.   Musculoskeletal:         General: Normal range of motion.      Cervical back: Normal range of motion and neck supple.     Neurological: He is oriented to person, place, and time. No cranial nerve deficit or sensory deficit.   Skin:   Irregular blanching surface that extends across the entire middle back.   Psychiatric: He has a normal mood and affect.         ED Course   Procedures  Labs Reviewed - No data to display        Imaging Results    None          Medications   doxycycline tablet 100 mg (100 mg Oral Given 1/21/22 1957)   mupirocin 2 % ointment ( Topical (Top) Given 1/21/22 1956)   naproxen tablet 250 mg (250 mg Oral Given 1/21/22 1957)     Medical Decision Making:   History:   Old Medical Records: I decided to obtain old medical records.  Initial Assessment:   46-year-old male presents to the emergency department with a rash that covers the mid back.  It is irregularly pigmented with an irregular border.  There is clear fluid weeping from it in locations.  He reports started only this morning.  He has use no medications or treatments for the problem.  He reports that there is back pain accompanying the problem.  Denies falls or injuries, numbness or tingling x4 extremities.  Differential Diagnosis:   Musculoskeletal back pain, disseminated herpes zoster, fixed drug reaction, impetigo          Scribe Attestation:   Scribe #1: I performed the above scribed service and the documentation accurately describes the services I performed. I attest to the accuracy of the note.                ED Course as of 01/21/22 2055 Fri Jan 21, 2022 1935 BP: 130/77 [VC]   1935 Temp: 98.1 °F (36.7 °C) [VC]   1935 Temp src: Oral [VC]   1935 Pulse: 84 [VC]   1935 Resp: 16 [VC]   1935 SpO2: 98 % [VC]   1942 Sbar given to Dr. Torres.Photos taken and in media tab. [VC]      ED Course User Index  [VC] Gordon Figueredo, SOY Torres examined the patient and feels that this is likely impetigo.  We have started treatment with doxycycline and a prescription was written for same.  Mupirocin ointment was applied and a prescription was written.  Clinoril is provided for pain control.  See AVS for additional recommendations. Medications listed below were prescribed after reviewing the patient's allergies, medication list, history, most recent laboratories as available.  Referrals below were provided after reviewing the patient's previous  medical providers. He understands he  should return for any worsening or changes in condition.  Prior to discharge the patient was asked if he  had any additional concerns or complaints and he declined. The patient was given an opportunity to ask questions and all were answered to his satisfaction.    Medications given in the ER During this Visit:  Medications   doxycycline tablet 100 mg (100 mg Oral Given 1/21/22 1957)   mupirocin 2 % ointment ( Topical (Top) Given 1/21/22 1956)   naproxen tablet 250 mg (250 mg Oral Given 1/21/22 1957)         Provider Attestation: Scribe attestation: I, Gordon Figueredo DNP ACNP-BC FNP-C ENP-C,  personally performed the services described in this documentation. All medical record entries made by the scribe were at my direction and in my presence.  I have reviewed the chart and agree that the record reflects my personal performance and is accurate and complete.     Clinical Impression:   Final diagnoses:  [R21] Rash (Primary)          ED Disposition Condition    Discharge Stable        ED Prescriptions     Medication Sig Dispense Start Date End Date Auth. Provider    doxycycline (MONODOX) 100 MG capsule Take 1 capsule (100 mg total) by mouth 2 (two) times daily. for 10 days 20 capsule 1/21/2022 1/31/2022 Gordon Figueredo DNP    mupirocin (BACTROBAN) 2 % ointment Apply topically 3 (three) times daily. 45 g 1/21/2022  Gordon Figueredo DNP    sulindac (CLINORIL) 150 MG tablet Take 1 tablet (150 mg total) by mouth 2 (two) times daily. 10 tablet 1/21/2022  Gordon Figueredo DNP        Follow-up Information     Follow up With Specialties Details Why Contact Info    Lincoln Ruff MD Dermatology Schedule an appointment as soon as possible for a visit   [address]  [address]  [place] [facility]  [place] [address]  [phone]             Gordon Figueredo DNP  01/21/22 2055

## 2022-01-22 NOTE — DISCHARGE INSTRUCTIONS
Take antibiotics as ordered. You have been prescribed clinoril (sulindac), an anti-inflammatory.  Take this medication whether you feel you need it or not.  Do not take ibuprofen, naproxen or other NSAID's medications while taking this medication. Do not take prescribed medications for at least 8h after medications given in the Emergency Department.  Return to the Emergency Department for any worsening, change in condition, or any emergent concerns.

## 2022-02-21 ENCOUNTER — HOSPITAL ENCOUNTER (EMERGENCY)
Facility: HOSPITAL | Age: 47
Discharge: HOME | End: 2022-02-21
Attending: STUDENT IN AN ORGANIZED HEALTH CARE EDUCATION/TRAINING PROGRAM
Payer: MEDICAID

## 2022-02-21 VITALS
OXYGEN SATURATION: 98 % | BODY MASS INDEX: 30.41 KG/M2 | SYSTOLIC BLOOD PRESSURE: 126 MMHG | RESPIRATION RATE: 18 BRPM | WEIGHT: 200 LBS | TEMPERATURE: 98 F | HEART RATE: 98 BPM | DIASTOLIC BLOOD PRESSURE: 73 MMHG

## 2022-02-21 DIAGNOSIS — M25.561 RIGHT KNEE PAIN: Primary | ICD-10-CM

## 2022-02-21 DIAGNOSIS — M17.11 OSTEOARTHRITIS OF RIGHT KNEE, UNSPECIFIED OSTEOARTHRITIS TYPE: ICD-10-CM

## 2022-02-21 DIAGNOSIS — M25.461 EFFUSION OF RIGHT KNEE: ICD-10-CM

## 2022-02-21 PROCEDURE — 63600175 PHARM REV CODE 636 W HCPCS: Mod: ER | Performed by: NURSE PRACTITIONER

## 2022-02-21 PROCEDURE — 99284 EMERGENCY DEPT VISIT MOD MDM: CPT | Mod: 25,ER

## 2022-02-21 PROCEDURE — 96372 THER/PROPH/DIAG INJ SC/IM: CPT | Mod: ER

## 2022-02-21 RX ORDER — DEXTROMETHORPHAN HYDROBROMIDE, GUAIFENESIN 5; 100 MG/5ML; MG/5ML
650 LIQUID ORAL EVERY 8 HOURS
Qty: 20 TABLET | Refills: 0 | Status: SHIPPED | OUTPATIENT
Start: 2022-02-21 | End: 2022-02-26

## 2022-02-21 RX ORDER — IBUPROFEN 600 MG/1
600 TABLET ORAL EVERY 6 HOURS PRN
Qty: 20 TABLET | Refills: 0 | Status: SHIPPED | OUTPATIENT
Start: 2022-02-21 | End: 2022-02-26

## 2022-02-21 RX ORDER — KETOROLAC TROMETHAMINE 30 MG/ML
30 INJECTION, SOLUTION INTRAMUSCULAR; INTRAVENOUS
Status: COMPLETED | OUTPATIENT
Start: 2022-02-21 | End: 2022-02-21

## 2022-02-21 RX ORDER — METHOCARBAMOL 500 MG/1
1000 TABLET, FILM COATED ORAL 3 TIMES DAILY
Qty: 30 TABLET | Refills: 0 | Status: SHIPPED | OUTPATIENT
Start: 2022-02-21 | End: 2022-02-26

## 2022-02-21 RX ADMIN — KETOROLAC TROMETHAMINE 30 MG: 30 INJECTION, SOLUTION INTRAMUSCULAR at 02:02

## 2022-02-21 NOTE — FIRST PROVIDER EVALUATION
" Emergency Department TeleTriage Encounter Note      CHIEF COMPLAINT    Chief Complaint   Patient presents with    Knee Pain     Pt states," I have pain in my right knee for a few days."       VITAL SIGNS   Initial Vitals [02/21/22 1213]   BP Pulse Resp Temp SpO2   (!) 141/76 82 16 98 °F (36.7 °C) 97 %      MAP       --            ALLERGIES    Review of patient's allergies indicates:  No Known Allergies    PROVIDER TRIAGE NOTE  This is a teletriage evaluation of a 46 y.o. male presenting to the ED with c/o right knee pain for the past 18 months, pt states pain increased 3-4 days ago.  Pt denies any falls or trauma.  Pt had a cortisone shot a month ago.          All ED beds are full at present; patient notified of this status.  Patient seen and medically screened by Nurse Practitioner via teletriage. Orders initiated at triage to expedite care.  Patient is stable to return to the waiting room and will be placed in an ED bed when available.  Care will be transferred to an alternate provider when patient has been placed in an Exam Room from the [facility] for physical exam, additional orders, and disposition.          ORDERS  Labs Reviewed - No data to display    ED Orders (720h ago, onward)    None            Virtual Visit Note: The provider triage portion of this emergency department evaluation and documentation was performed via [employer], a HIPAA-compliant telemedicine application, in concert with a tele-presenter in the room. A face to face patient evaluation with one of my colleagues will occur once the patient is placed in an emergency department room.      DISCLAIMER: This note was prepared with M*[employer] voice recognition transcription software. Garbled syntax, mangled pronouns, and other bizarre constructions may be attributed to that software system.  "

## 2022-02-21 NOTE — Clinical Note
"Roverto Wolfe" Sandy was seen and treated in our emergency department on 2/21/2022.  He may return to work on 02/23/2022.       If you have any questions or concerns, please don't hesitate to call.      PADILLA Haile"

## 2022-02-21 NOTE — ED PROVIDER NOTES
"Encounter Date: 2/21/2022       History     Chief Complaint   Patient presents with    Knee Pain     Pt states," I have pain in my right knee for a few days."     47 y/o male presents to the ED with complaints of right knee pain intermittently for 1-2 years.  3 days ago he slipped and fell causing more pain & swelling to that knee.  Patient denies neck pain, rash, fever, chest pain, SOB, numbness, weakness, tingling, abdominal pain, back pain, dysuria, hematuria, nausea, vomiting, diarrhea, or any other complaints.  Patient rates the pain as 8/10 and has not taken any medications for the symptoms.  No Alleviating/aggravating factors.  He was seen at [facility] approximately 1 month ago and had an injection in that same knee.                  Review of patient's allergies indicates:  No Known Allergies  Past Medical History:   Diagnosis Date    Hypertension     Pancreatitis      Past Surgical History:   Procedure Laterality Date    CHOLECYSTECTOMY       No family history on file.  Social History     Tobacco Use    Smoking status: Current Some Day Smoker    Smokeless tobacco: Current User     Types: Snuff   Substance Use Topics    Alcohol use: Not Currently     Comment: occasionally    Drug use: Never     Review of Systems   Constitutional: Negative for chills and fever.   HENT: Negative for congestion, ear pain, rhinorrhea and sore throat.    Eyes: Negative for pain, discharge and redness.   Respiratory: Negative for cough and shortness of breath.    Cardiovascular: Negative for chest pain.   Gastrointestinal: Negative for abdominal pain, diarrhea, nausea and vomiting.   Genitourinary: Negative for dysuria.   Musculoskeletal: Positive for arthralgias. Negative for back pain, neck pain and neck stiffness.   Skin: Negative for rash.   Neurological: Negative for dizziness, weakness, light-headedness, numbness and headaches.   Psychiatric/Behavioral: Negative for confusion.       Physical Exam     Initial Vitals " [02/21/22 1213]   BP Pulse Resp Temp SpO2   (!) 141/76 82 16 98 °F (36.7 °C) 97 %      MAP       --         Physical Exam    Nursing note and vitals reviewed.  Constitutional: Vital signs are normal. He appears well-developed. He is cooperative.  Non-toxic appearance. He does not appear ill.   HENT:   Head: Normocephalic and atraumatic.   Right Ear: External ear normal.   Left Ear: External ear normal.   Nose: Nose normal.   Mouth/Throat: Oropharynx is clear and moist.   Eyes: Conjunctivae are normal.   Neck:   Normal range of motion.  Cardiovascular: Normal rate and regular rhythm.   Pulmonary/Chest: Effort normal and breath sounds normal.   Musculoskeletal:      Cervical back: Normal range of motion.      Right knee: Effusion present. Normal range of motion. Tenderness present.      Instability Tests: Anterior drawer test negative. Posterior drawer test negative.      Comments: Tenderness to right knee with mild effusion; normal ROM, strength, and sensation; negative anterior/posterior drawer; skin intact; normal gait     Neurological: He is alert and oriented to person, place, and time. GCS eye subscore is 4. GCS verbal subscore is 5. GCS motor subscore is 6.   Skin: Skin is warm, dry and intact. No rash noted.   Psychiatric: He has a normal mood and affect. His speech is normal and behavior is normal. Thought content normal.         ED Course   Orthopedic Injury    Date/Time: 2/21/2022 2:59 PM  Performed by: JONATHAN HaileP  Authorized by: Elvi Vicente,      Location procedure was performed:  [facility] EMERGENCY DEPARTMENT  Injury:     Injury location:  Knee    Location details:  Right knee    Injury type:  Soft tissue      Pre-procedure assessment:     Neurovascular status: Neurovascularly intact      Distal perfusion: normal      Neurological function: normal      Range of motion: normal        Selections made in this section will also lock the Injury type section above.:     Supplies used:  Elastic  bandage    Complications: No      Estimated blood loss (mL):  0    Specimens: No      Implants: No    Post-procedure assessment:     Neurovascular status: Neurovascularly intact      Distal perfusion: normal      Neurological function: normal      Range of motion: normal      Patient tolerance:  Patient tolerated the procedure well with no immediate complications      Labs Reviewed - No data to display       Imaging Results          X-Ray Knee 3 View Right (Final result)  Result time 02/21/22 14:31:51    Final result by Manuel Liu MD (02/21/22 14:31:51)                 Impression:      No displaced fracture.    Tricompartmental degenerative changes and possible trace joint effusion.      Electronically signed by: Manuel Liu MD  Date:    02/21/2022  Time:    14:31             Narrative:    EXAMINATION:  XR KNEE 3 VIEW RIGHT    CLINICAL HISTORY:  Pain in right knee    TECHNIQUE:  AP, lateral, and Merchant views of the right knee were performed.    COMPARISON:  01/05/2022.    FINDINGS:  No evidence of acute fracture or dislocation.  There are mild tricompartmental degenerative changes, most pronounced in the medial compartment.  Similar findings were present on the prior study.  Question trace right effusion.  No unexpected radiopaque foreign body.                                 Medications   ketorolac injection 30 mg (30 mg Intramuscular Given 2/21/22 8558)           APC / Resident Notes:   This is an evaluation of a 46 y.o. male that presents to the Emergency Department for right knee pain    Physical Exam shows a non-toxic, afebrile, and well appearing male. Tenderness to right knee with mild effusion; normal ROM, strength, and sensation; negative anterior/posterior drawer; skin intact; normal gait    Vital signs are reassuring. If available, previous records reviewed.   RESULTS: Xray showed OA with small effusion    My overall impression is Right knee pain, knee effusion, OA. I considered, but at this  time, do not suspect fracture, dislocation, septic joint, cellulitis.    ED Course: Xray, toradol, Ice, Ace. Discharge Meds/Instructions: tylenol, Ibuprofen, Robaxin. The diagnosis, treatment plan, instructions for follow-up as well as ED return precautions were discussed. All questions have been answered.                      Clinical Impression:   Final diagnoses:  [M25.561] Right knee pain (Primary)  [M17.11] Osteoarthritis of right knee, unspecified osteoarthritis type  [M25.461] Effusion of right knee          ED Disposition Condition    Discharge Stable        ED Prescriptions     Medication Sig Dispense Start Date End Date Auth. Provider    acetaminophen (TYLENOL) 650 MG TbSR Take 1 tablet (650 mg total) by mouth every 8 (eight) hours. for 5 days 20 tablet 2/21/2022 2/26/2022 PADILLA Haile    ibuprofen (ADVIL,MOTRIN) 600 MG tablet Take 1 tablet (600 mg total) by mouth every 6 (six) hours as needed for Pain. 20 tablet 2/21/2022 2/26/2022 PADILLA Haile    methocarbamoL (ROBAXIN) 500 MG Tab Take 2 tablets (1,000 mg total) by mouth 3 (three) times daily. for 5 days 30 tablet 2/21/2022 2/26/2022 PADILLA Haile        Follow-up Information     Follow up With Specialties Details Why Contact [facility] - Musculosketetal Neuromusculoskeletal Medicine Schedule an appointment as soon as possible for a visit in 2 days  [address] [facility] [address]  [phone]      [facility] ED Emergency Medicine Go to  If symptoms worsen [address] [address] [phone]           PADILLA Halie  02/21/22 6100

## 2024-06-07 DIAGNOSIS — M25.562 LEFT KNEE PAIN, UNSPECIFIED CHRONICITY: Primary | ICD-10-CM

## 2024-10-01 ENCOUNTER — HOSPITAL ENCOUNTER (OUTPATIENT)
Dept: RADIOLOGY | Facility: HOSPITAL | Age: 49
Discharge: HOME | End: 2024-10-01
Attending: ORTHOPAEDIC SURGERY
Payer: MEDICAID

## 2024-10-01 DIAGNOSIS — M25.562 LEFT KNEE PAIN, UNSPECIFIED CHRONICITY: ICD-10-CM

## 2024-10-01 PROCEDURE — 73564 X-RAY EXAM KNEE 4 OR MORE: CPT | Mod: 26,LT,, | Performed by: RADIOLOGY

## 2024-10-01 PROCEDURE — 73564 X-RAY EXAM KNEE 4 OR MORE: CPT | Mod: TC,FY,LT
